# Patient Record
Sex: FEMALE | Race: WHITE | NOT HISPANIC OR LATINO | Employment: OTHER | URBAN - METROPOLITAN AREA
[De-identification: names, ages, dates, MRNs, and addresses within clinical notes are randomized per-mention and may not be internally consistent; named-entity substitution may affect disease eponyms.]

---

## 2018-10-28 ENCOUNTER — HOSPITAL ENCOUNTER (INPATIENT)
Facility: HOSPITAL | Age: 48
LOS: 5 days | Discharge: HOME | DRG: 698 | End: 2018-11-02
Attending: INTERNAL MEDICINE | Admitting: INTERNAL MEDICINE
Payer: MEDICAID

## 2018-10-28 DIAGNOSIS — N12 PYELONEPHRITIS: Primary | ICD-10-CM

## 2018-10-28 DIAGNOSIS — E11.9 DIABETES MELLITUS: ICD-10-CM

## 2018-10-28 DIAGNOSIS — A41.9 SEPSIS: ICD-10-CM

## 2018-10-28 DIAGNOSIS — E11.10 DIABETIC KETOACIDOSIS WITHOUT COMA ASSOCIATED WITH TYPE 2 DIABETES MELLITUS: ICD-10-CM

## 2018-10-28 PROBLEM — D84.9 IMMUNOSUPPRESSION: Status: ACTIVE | Noted: 2018-10-28

## 2018-10-28 PROBLEM — E86.0 DEHYDRATION: Status: ACTIVE | Noted: 2018-10-28

## 2018-10-28 PROBLEM — N17.9 AKI (ACUTE KIDNEY INJURY): Status: ACTIVE | Noted: 2018-10-28

## 2018-10-28 PROBLEM — Z94.0 HISTORY OF KIDNEY TRANSPLANT: Status: ACTIVE | Noted: 2018-10-28

## 2018-10-28 LAB
ALBUMIN SERPL BCP-MCNC: 2.3 G/DL
ALLENS TEST: ABNORMAL
ALP SERPL-CCNC: 78 U/L
ALT SERPL W/O P-5'-P-CCNC: 49 U/L
ANION GAP SERPL CALC-SCNC: 14 MMOL/L
AST SERPL-CCNC: 52 U/L
BACTERIA #/AREA URNS AUTO: ABNORMAL /HPF
BASOPHILS # BLD AUTO: 0.02 K/UL
BASOPHILS NFR BLD: 0.2 %
BILIRUB SERPL-MCNC: 0.4 MG/DL
BILIRUB UR QL STRIP: NEGATIVE
BUN SERPL-MCNC: 93 MG/DL
CALCIUM SERPL-MCNC: 8.2 MG/DL
CHLORIDE SERPL-SCNC: 104 MMOL/L
CLARITY UR REFRACT.AUTO: ABNORMAL
CO2 SERPL-SCNC: 12 MMOL/L
COLOR UR AUTO: YELLOW
CREAT SERPL-MCNC: 5.8 MG/DL
DELSYS: ABNORMAL
DIFFERENTIAL METHOD: ABNORMAL
EOSINOPHIL # BLD AUTO: 0 K/UL
EOSINOPHIL NFR BLD: 0 %
ERYTHROCYTE [DISTWIDTH] IN BLOOD BY AUTOMATED COUNT: 12.2 %
EST. GFR  (AFRICAN AMERICAN): 9.1 ML/MIN/1.73 M^2
EST. GFR  (NON AFRICAN AMERICAN): 7.9 ML/MIN/1.73 M^2
GLUCOSE SERPL-MCNC: 465 MG/DL
GLUCOSE UR QL STRIP: ABNORMAL
HCO3 UR-SCNC: 10.9 MMOL/L (ref 24–28)
HCT VFR BLD AUTO: 36.2 %
HGB BLD-MCNC: 12.4 G/DL
HGB UR QL STRIP: ABNORMAL
IMM GRANULOCYTES # BLD AUTO: 0.11 K/UL
IMM GRANULOCYTES NFR BLD AUTO: 1.2 %
KETONES UR QL STRIP: ABNORMAL
LACTATE SERPL-SCNC: 0.9 MMOL/L
LEUKOCYTE ESTERASE UR QL STRIP: ABNORMAL
LYMPHOCYTES # BLD AUTO: 0.6 K/UL
LYMPHOCYTES NFR BLD: 6.9 %
MCH RBC QN AUTO: 29.8 PG
MCHC RBC AUTO-ENTMCNC: 34.3 G/DL
MCV RBC AUTO: 87 FL
MICROSCOPIC COMMENT: ABNORMAL
MODE: ABNORMAL
MONOCYTES # BLD AUTO: 0.3 K/UL
MONOCYTES NFR BLD: 3.2 %
NEUTROPHILS # BLD AUTO: 8.1 K/UL
NEUTROPHILS NFR BLD: 88.5 %
NITRITE UR QL STRIP: NEGATIVE
NRBC BLD-RTO: 0 /100 WBC
PCO2 BLDA: 23.4 MMHG (ref 35–45)
PH SMN: 7.28 [PH] (ref 7.35–7.45)
PH UR STRIP: 5 [PH] (ref 5–8)
PLATELET # BLD AUTO: 84 K/UL
PMV BLD AUTO: 11.6 FL
PO2 BLDA: 85 MMHG (ref 80–100)
POC BE: -16 MMOL/L
POC SATURATED O2: 95 % (ref 95–100)
POC TCO2: 12 MMOL/L (ref 23–27)
POCT GLUCOSE: 402 MG/DL (ref 70–110)
POTASSIUM SERPL-SCNC: 4.5 MMOL/L
PROT SERPL-MCNC: 6.1 G/DL
PROT UR QL STRIP: NEGATIVE
RBC # BLD AUTO: 4.16 M/UL
RBC #/AREA URNS AUTO: 13 /HPF (ref 0–4)
SAMPLE: ABNORMAL
SITE: ABNORMAL
SODIUM SERPL-SCNC: 130 MMOL/L
SP GR UR STRIP: 1.01 (ref 1–1.03)
SP02: 99
SQUAMOUS #/AREA URNS AUTO: 1 /HPF
URN SPEC COLLECT METH UR: ABNORMAL
WBC # BLD AUTO: 9.13 K/UL
WBC #/AREA URNS AUTO: 12 /HPF (ref 0–5)
YEAST UR QL AUTO: ABNORMAL

## 2018-10-28 PROCEDURE — 84100 ASSAY OF PHOSPHORUS: CPT

## 2018-10-28 PROCEDURE — 85025 COMPLETE CBC W/AUTO DIFF WBC: CPT

## 2018-10-28 PROCEDURE — 87186 SC STD MICRODIL/AGAR DIL: CPT

## 2018-10-28 PROCEDURE — 20000000 HC ICU ROOM

## 2018-10-28 PROCEDURE — 63600175 PHARM REV CODE 636 W HCPCS: Performed by: STUDENT IN AN ORGANIZED HEALTH CARE EDUCATION/TRAINING PROGRAM

## 2018-10-28 PROCEDURE — 51798 US URINE CAPACITY MEASURE: CPT

## 2018-10-28 PROCEDURE — 87077 CULTURE AEROBIC IDENTIFY: CPT

## 2018-10-28 PROCEDURE — 51701 INSERT BLADDER CATHETER: CPT

## 2018-10-28 PROCEDURE — 99900035 HC TECH TIME PER 15 MIN (STAT)

## 2018-10-28 PROCEDURE — 99233 SBSQ HOSP IP/OBS HIGH 50: CPT | Mod: ,,, | Performed by: INTERNAL MEDICINE

## 2018-10-28 PROCEDURE — 87088 URINE BACTERIA CULTURE: CPT

## 2018-10-28 PROCEDURE — 51702 INSERT TEMP BLADDER CATH: CPT

## 2018-10-28 PROCEDURE — 83605 ASSAY OF LACTIC ACID: CPT

## 2018-10-28 PROCEDURE — 87086 URINE CULTURE/COLONY COUNT: CPT

## 2018-10-28 PROCEDURE — 80053 COMPREHEN METABOLIC PANEL: CPT

## 2018-10-28 PROCEDURE — 99223 1ST HOSP IP/OBS HIGH 75: CPT | Mod: AI,,, | Performed by: INTERNAL MEDICINE

## 2018-10-28 PROCEDURE — 25000003 PHARM REV CODE 250: Performed by: STUDENT IN AN ORGANIZED HEALTH CARE EDUCATION/TRAINING PROGRAM

## 2018-10-28 PROCEDURE — 81001 URINALYSIS AUTO W/SCOPE: CPT

## 2018-10-28 PROCEDURE — 82803 BLOOD GASES ANY COMBINATION: CPT

## 2018-10-28 PROCEDURE — 36600 WITHDRAWAL OF ARTERIAL BLOOD: CPT

## 2018-10-28 PROCEDURE — 94761 N-INVAS EAR/PLS OXIMETRY MLT: CPT

## 2018-10-28 RX ORDER — GLUCAGON 1 MG
1 KIT INJECTION
Status: DISCONTINUED | OUTPATIENT
Start: 2018-10-28 | End: 2018-10-29

## 2018-10-28 RX ORDER — ENOXAPARIN SODIUM 100 MG/ML
30 INJECTION SUBCUTANEOUS EVERY 24 HOURS
Status: DISCONTINUED | OUTPATIENT
Start: 2018-10-28 | End: 2018-10-29

## 2018-10-28 RX ORDER — PREDNISONE 5 MG/1
5 TABLET ORAL DAILY
Status: DISCONTINUED | OUTPATIENT
Start: 2018-10-29 | End: 2018-11-02 | Stop reason: HOSPADM

## 2018-10-28 RX ORDER — MAGNESIUM SULFATE HEPTAHYDRATE 40 MG/ML
4 INJECTION, SOLUTION INTRAVENOUS
Status: DISCONTINUED | OUTPATIENT
Start: 2018-10-28 | End: 2018-10-29

## 2018-10-28 RX ORDER — PANTOPRAZOLE SODIUM 40 MG/10ML
40 INJECTION, POWDER, LYOPHILIZED, FOR SOLUTION INTRAVENOUS DAILY
Status: DISCONTINUED | OUTPATIENT
Start: 2018-10-29 | End: 2018-10-29

## 2018-10-28 RX ORDER — LANOLIN ALCOHOL/MO/W.PET/CERES
800 CREAM (GRAM) TOPICAL
Status: DISCONTINUED | OUTPATIENT
Start: 2018-10-28 | End: 2018-10-29

## 2018-10-28 RX ORDER — POTASSIUM CHLORIDE 20 MEQ/15ML
40 SOLUTION ORAL
Status: DISCONTINUED | OUTPATIENT
Start: 2018-10-28 | End: 2018-10-29

## 2018-10-28 RX ORDER — INSULIN ASPART 100 [IU]/ML
0-5 INJECTION, SOLUTION INTRAVENOUS; SUBCUTANEOUS
Status: DISCONTINUED | OUTPATIENT
Start: 2018-10-28 | End: 2018-10-28

## 2018-10-28 RX ORDER — ESCITALOPRAM OXALATE 10 MG/1
10 TABLET ORAL DAILY
Status: DISCONTINUED | OUTPATIENT
Start: 2018-10-28 | End: 2018-11-02 | Stop reason: HOSPADM

## 2018-10-28 RX ORDER — SODIUM CHLORIDE 0.9 % (FLUSH) 0.9 %
3 SYRINGE (ML) INJECTION
Status: DISCONTINUED | OUTPATIENT
Start: 2018-10-28 | End: 2018-11-02 | Stop reason: HOSPADM

## 2018-10-28 RX ORDER — IBUPROFEN 200 MG
16 TABLET ORAL
Status: DISCONTINUED | OUTPATIENT
Start: 2018-10-28 | End: 2018-10-29

## 2018-10-28 RX ORDER — MAGNESIUM SULFATE HEPTAHYDRATE 40 MG/ML
2 INJECTION, SOLUTION INTRAVENOUS
Status: DISCONTINUED | OUTPATIENT
Start: 2018-10-28 | End: 2018-10-29

## 2018-10-28 RX ORDER — ONDANSETRON 2 MG/ML
4 INJECTION INTRAMUSCULAR; INTRAVENOUS EVERY 6 HOURS PRN
Status: DISCONTINUED | OUTPATIENT
Start: 2018-10-28 | End: 2018-11-02 | Stop reason: HOSPADM

## 2018-10-28 RX ORDER — IBUPROFEN 200 MG
24 TABLET ORAL
Status: DISCONTINUED | OUTPATIENT
Start: 2018-10-28 | End: 2018-10-29

## 2018-10-28 RX ADMIN — ESCITALOPRAM OXALATE 10 MG: 10 TABLET ORAL at 10:10

## 2018-10-28 RX ADMIN — INSULIN ASPART 3 UNITS: 100 INJECTION, SOLUTION INTRAVENOUS; SUBCUTANEOUS at 10:10

## 2018-10-28 RX ADMIN — ENOXAPARIN SODIUM 30 MG: 100 INJECTION SUBCUTANEOUS at 10:10

## 2018-10-29 PROBLEM — E83.42 HYPOMAGNESEMIA: Status: ACTIVE | Noted: 2018-10-29

## 2018-10-29 PROBLEM — Z79.60 LONG-TERM USE OF IMMUNOSUPPRESSANT MEDICATION: Status: ACTIVE | Noted: 2018-10-29

## 2018-10-29 LAB
ANION GAP SERPL CALC-SCNC: 10 MMOL/L
ANION GAP SERPL CALC-SCNC: 11 MMOL/L
ANION GAP SERPL CALC-SCNC: 15 MMOL/L
ANION GAP SERPL CALC-SCNC: 9 MMOL/L
ANION GAP SERPL CALC-SCNC: 9 MMOL/L
B-OH-BUTYR BLD STRIP-SCNC: 3 MMOL/L
BASOPHILS # BLD AUTO: 0.02 K/UL
BASOPHILS NFR BLD: 0.2 %
BUN SERPL-MCNC: 100 MG/DL
BUN SERPL-MCNC: 95 MG/DL
BUN SERPL-MCNC: 96 MG/DL
BUN SERPL-MCNC: 96 MG/DL
BUN SERPL-MCNC: 99 MG/DL
CALCIUM SERPL-MCNC: 8.3 MG/DL
CALCIUM SERPL-MCNC: 8.4 MG/DL
CALCIUM SERPL-MCNC: 8.4 MG/DL
CALCIUM SERPL-MCNC: 8.6 MG/DL
CALCIUM SERPL-MCNC: 8.9 MG/DL
CHLORIDE SERPL-SCNC: 102 MMOL/L
CHLORIDE SERPL-SCNC: 104 MMOL/L
CHLORIDE SERPL-SCNC: 105 MMOL/L
CHLORIDE SERPL-SCNC: 105 MMOL/L
CHLORIDE SERPL-SCNC: 106 MMOL/L
CHLORIDE UR-SCNC: 70 MMOL/L
CO2 SERPL-SCNC: 12 MMOL/L
CO2 SERPL-SCNC: 18 MMOL/L
CO2 SERPL-SCNC: 19 MMOL/L
CO2 SERPL-SCNC: 19 MMOL/L
CO2 SERPL-SCNC: 20 MMOL/L
CREAT SERPL-MCNC: 5.2 MG/DL
CREAT SERPL-MCNC: 5.2 MG/DL
CREAT SERPL-MCNC: 5.3 MG/DL
CREAT SERPL-MCNC: 5.7 MG/DL
CREAT SERPL-MCNC: 5.8 MG/DL
DIFFERENTIAL METHOD: ABNORMAL
EOSINOPHIL # BLD AUTO: 0 K/UL
EOSINOPHIL NFR BLD: 0 %
ERYTHROCYTE [DISTWIDTH] IN BLOOD BY AUTOMATED COUNT: 12.2 %
EST. GFR  (AFRICAN AMERICAN): 10.2 ML/MIN/1.73 M^2
EST. GFR  (AFRICAN AMERICAN): 10.4 ML/MIN/1.73 M^2
EST. GFR  (AFRICAN AMERICAN): 10.4 ML/MIN/1.73 M^2
EST. GFR  (AFRICAN AMERICAN): 9.1 ML/MIN/1.73 M^2
EST. GFR  (AFRICAN AMERICAN): 9.3 ML/MIN/1.73 M^2
EST. GFR  (NON AFRICAN AMERICAN): 7.9 ML/MIN/1.73 M^2
EST. GFR  (NON AFRICAN AMERICAN): 8.1 ML/MIN/1.73 M^2
EST. GFR  (NON AFRICAN AMERICAN): 8.8 ML/MIN/1.73 M^2
EST. GFR  (NON AFRICAN AMERICAN): 9 ML/MIN/1.73 M^2
EST. GFR  (NON AFRICAN AMERICAN): 9 ML/MIN/1.73 M^2
ESTIMATED AVG GLUCOSE: 226 MG/DL
GLUCOSE SERPL-MCNC: 215 MG/DL
GLUCOSE SERPL-MCNC: 222 MG/DL
GLUCOSE SERPL-MCNC: 249 MG/DL
GLUCOSE SERPL-MCNC: 431 MG/DL
GLUCOSE SERPL-MCNC: 567 MG/DL
HBA1C MFR BLD HPLC: 9.5 %
HCT VFR BLD AUTO: 35.9 %
HGB BLD-MCNC: 12.6 G/DL
IMM GRANULOCYTES # BLD AUTO: 0.11 K/UL
IMM GRANULOCYTES NFR BLD AUTO: 1.2 %
LYMPHOCYTES # BLD AUTO: 0.6 K/UL
LYMPHOCYTES NFR BLD: 6 %
MAGNESIUM SERPL-MCNC: 1.4 MG/DL
MAGNESIUM SERPL-MCNC: 2.4 MG/DL
MAGNESIUM SERPL-MCNC: 2.5 MG/DL
MAGNESIUM SERPL-MCNC: 2.6 MG/DL
MCH RBC QN AUTO: 30.1 PG
MCHC RBC AUTO-ENTMCNC: 35.1 G/DL
MCV RBC AUTO: 86 FL
MONOCYTES # BLD AUTO: 0.3 K/UL
MONOCYTES NFR BLD: 3.3 %
NEUTROPHILS # BLD AUTO: 8.4 K/UL
NEUTROPHILS NFR BLD: 89.3 %
NRBC BLD-RTO: 0 /100 WBC
PHOSPHATE SERPL-MCNC: 2.9 MG/DL
PHOSPHATE SERPL-MCNC: 3.4 MG/DL
PHOSPHATE SERPL-MCNC: 3.9 MG/DL
PHOSPHATE SERPL-MCNC: 4.1 MG/DL
PHOSPHATE SERPL-MCNC: 4.2 MG/DL
PHOSPHATE SERPL-MCNC: 4.6 MG/DL
PLATELET # BLD AUTO: 91 K/UL
PMV BLD AUTO: 11.9 FL
POCT GLUCOSE: 204 MG/DL (ref 70–110)
POCT GLUCOSE: 205 MG/DL (ref 70–110)
POCT GLUCOSE: 216 MG/DL (ref 70–110)
POCT GLUCOSE: 217 MG/DL (ref 70–110)
POCT GLUCOSE: 217 MG/DL (ref 70–110)
POCT GLUCOSE: 225 MG/DL (ref 70–110)
POCT GLUCOSE: 253 MG/DL (ref 70–110)
POCT GLUCOSE: 296 MG/DL (ref 70–110)
POCT GLUCOSE: 371 MG/DL (ref 70–110)
POCT GLUCOSE: 411 MG/DL (ref 70–110)
POCT GLUCOSE: 447 MG/DL (ref 70–110)
POCT GLUCOSE: 475 MG/DL (ref 70–110)
POCT GLUCOSE: 482 MG/DL (ref 70–110)
POCT GLUCOSE: 493 MG/DL (ref 70–110)
POCT GLUCOSE: >500 MG/DL (ref 70–110)
POTASSIUM SERPL-SCNC: 3.5 MMOL/L
POTASSIUM SERPL-SCNC: 3.6 MMOL/L
POTASSIUM SERPL-SCNC: 3.7 MMOL/L
POTASSIUM SERPL-SCNC: 4 MMOL/L
POTASSIUM SERPL-SCNC: 4.5 MMOL/L
RBC # BLD AUTO: 4.19 M/UL
SODIUM SERPL-SCNC: 129 MMOL/L
SODIUM SERPL-SCNC: 132 MMOL/L
SODIUM SERPL-SCNC: 134 MMOL/L
SODIUM SERPL-SCNC: 134 MMOL/L
SODIUM SERPL-SCNC: 135 MMOL/L
TACROLIMUS BLD-MCNC: 7.5 NG/ML
TACROLIMUS BLD-MCNC: 9 NG/ML
WBC # BLD AUTO: 9.38 K/UL

## 2018-10-29 PROCEDURE — 83735 ASSAY OF MAGNESIUM: CPT

## 2018-10-29 PROCEDURE — 25000003 PHARM REV CODE 250: Performed by: INTERNAL MEDICINE

## 2018-10-29 PROCEDURE — 02HV33Z INSERTION OF INFUSION DEVICE INTO SUPERIOR VENA CAVA, PERCUTANEOUS APPROACH: ICD-10-PCS | Performed by: INTERNAL MEDICINE

## 2018-10-29 PROCEDURE — 80197 ASSAY OF TACROLIMUS: CPT

## 2018-10-29 PROCEDURE — A4217 STERILE WATER/SALINE, 500 ML: HCPCS | Performed by: INTERNAL MEDICINE

## 2018-10-29 PROCEDURE — 85025 COMPLETE CBC W/AUTO DIFF WBC: CPT

## 2018-10-29 PROCEDURE — 36556 INSERT NON-TUNNEL CV CATH: CPT | Mod: ,,, | Performed by: INTERNAL MEDICINE

## 2018-10-29 PROCEDURE — 93005 ELECTROCARDIOGRAM TRACING: CPT

## 2018-10-29 PROCEDURE — 36415 COLL VENOUS BLD VENIPUNCTURE: CPT

## 2018-10-29 PROCEDURE — 84100 ASSAY OF PHOSPHORUS: CPT

## 2018-10-29 PROCEDURE — 84133 ASSAY OF URINE POTASSIUM: CPT

## 2018-10-29 PROCEDURE — 25000003 PHARM REV CODE 250: Performed by: STUDENT IN AN ORGANIZED HEALTH CARE EDUCATION/TRAINING PROGRAM

## 2018-10-29 PROCEDURE — C1751 CATH, INF, PER/CENT/MIDLINE: HCPCS

## 2018-10-29 PROCEDURE — 80048 BASIC METABOLIC PNL TOTAL CA: CPT | Mod: 91

## 2018-10-29 PROCEDURE — 82010 KETONE BODYS QUAN: CPT

## 2018-10-29 PROCEDURE — 84300 ASSAY OF URINE SODIUM: CPT

## 2018-10-29 PROCEDURE — 63600175 PHARM REV CODE 636 W HCPCS: Performed by: STUDENT IN AN ORGANIZED HEALTH CARE EDUCATION/TRAINING PROGRAM

## 2018-10-29 PROCEDURE — 36556 INSERT NON-TUNNEL CV CATH: CPT

## 2018-10-29 PROCEDURE — 20000000 HC ICU ROOM

## 2018-10-29 PROCEDURE — 83036 HEMOGLOBIN GLYCOSYLATED A1C: CPT

## 2018-10-29 PROCEDURE — C9113 INJ PANTOPRAZOLE SODIUM, VIA: HCPCS | Performed by: STUDENT IN AN ORGANIZED HEALTH CARE EDUCATION/TRAINING PROGRAM

## 2018-10-29 PROCEDURE — 93010 ELECTROCARDIOGRAM REPORT: CPT | Mod: ,,, | Performed by: INTERNAL MEDICINE

## 2018-10-29 PROCEDURE — 99233 SBSQ HOSP IP/OBS HIGH 50: CPT | Mod: ,,, | Performed by: INTERNAL MEDICINE

## 2018-10-29 PROCEDURE — 63600175 PHARM REV CODE 636 W HCPCS: Performed by: INTERNAL MEDICINE

## 2018-10-29 PROCEDURE — 80197 ASSAY OF TACROLIMUS: CPT | Mod: 91

## 2018-10-29 PROCEDURE — 82436 ASSAY OF URINE CHLORIDE: CPT

## 2018-10-29 PROCEDURE — 83735 ASSAY OF MAGNESIUM: CPT | Mod: 91

## 2018-10-29 RX ORDER — GLUCAGON 1 MG
1 KIT INJECTION
Status: DISCONTINUED | OUTPATIENT
Start: 2018-10-29 | End: 2018-10-30

## 2018-10-29 RX ORDER — INSULIN ASPART 100 [IU]/ML
0-5 INJECTION, SOLUTION INTRAVENOUS; SUBCUTANEOUS
Status: DISCONTINUED | OUTPATIENT
Start: 2018-10-29 | End: 2018-10-30

## 2018-10-29 RX ORDER — ACETAMINOPHEN 325 MG/1
650 TABLET ORAL EVERY 6 HOURS PRN
Status: DISCONTINUED | OUTPATIENT
Start: 2018-10-29 | End: 2018-11-02 | Stop reason: HOSPADM

## 2018-10-29 RX ORDER — SODIUM CHLORIDE, SODIUM LACTATE, POTASSIUM CHLORIDE, CALCIUM CHLORIDE 600; 310; 30; 20 MG/100ML; MG/100ML; MG/100ML; MG/100ML
INJECTION, SOLUTION INTRAVENOUS CONTINUOUS
Status: ACTIVE | OUTPATIENT
Start: 2018-10-29 | End: 2018-10-29

## 2018-10-29 RX ORDER — CEFTRIAXONE 1 G/1
1 INJECTION, POWDER, FOR SOLUTION INTRAMUSCULAR; INTRAVENOUS
Status: DISCONTINUED | OUTPATIENT
Start: 2018-10-29 | End: 2018-11-01

## 2018-10-29 RX ORDER — IBUPROFEN 200 MG
16 TABLET ORAL
Status: DISCONTINUED | OUTPATIENT
Start: 2018-10-29 | End: 2018-11-02 | Stop reason: HOSPADM

## 2018-10-29 RX ORDER — MAGNESIUM SULFATE HEPTAHYDRATE 40 MG/ML
2 INJECTION, SOLUTION INTRAVENOUS ONCE
Status: COMPLETED | OUTPATIENT
Start: 2018-10-29 | End: 2018-10-29

## 2018-10-29 RX ORDER — POTASSIUM CHLORIDE 20 MEQ/1
20 TABLET, EXTENDED RELEASE ORAL ONCE
Status: COMPLETED | OUTPATIENT
Start: 2018-10-29 | End: 2018-10-29

## 2018-10-29 RX ORDER — TACROLIMUS 1 MG/1
4 CAPSULE ORAL 2 TIMES DAILY
Status: DISCONTINUED | OUTPATIENT
Start: 2018-10-29 | End: 2018-10-30

## 2018-10-29 RX ORDER — IBUPROFEN 200 MG
24 TABLET ORAL
Status: DISCONTINUED | OUTPATIENT
Start: 2018-10-29 | End: 2018-11-02 | Stop reason: HOSPADM

## 2018-10-29 RX ORDER — POTASSIUM CHLORIDE 20 MEQ/1
20 TABLET, EXTENDED RELEASE ORAL EVERY 4 HOURS
Status: COMPLETED | OUTPATIENT
Start: 2018-10-29 | End: 2018-10-29

## 2018-10-29 RX ORDER — HEPARIN SODIUM 5000 [USP'U]/ML
5000 INJECTION, SOLUTION INTRAVENOUS; SUBCUTANEOUS EVERY 12 HOURS
Status: DISCONTINUED | OUTPATIENT
Start: 2018-10-29 | End: 2018-11-02 | Stop reason: HOSPADM

## 2018-10-29 RX ADMIN — SODIUM CHLORIDE, SODIUM LACTATE, POTASSIUM CHLORIDE, AND CALCIUM CHLORIDE 1000 ML: .6; .31; .03; .02 INJECTION, SOLUTION INTRAVENOUS at 01:10

## 2018-10-29 RX ADMIN — POTASSIUM CHLORIDE 20 MEQ: 1500 TABLET, EXTENDED RELEASE ORAL at 10:10

## 2018-10-29 RX ADMIN — HEPARIN SODIUM 5000 UNITS: 5000 INJECTION, SOLUTION INTRAVENOUS; SUBCUTANEOUS at 09:10

## 2018-10-29 RX ADMIN — PREDNISONE 5 MG: 2.5 TABLET ORAL at 08:10

## 2018-10-29 RX ADMIN — ESCITALOPRAM OXALATE 10 MG: 10 TABLET ORAL at 08:10

## 2018-10-29 RX ADMIN — SODIUM CHLORIDE, SODIUM LACTATE, POTASSIUM CHLORIDE, AND CALCIUM CHLORIDE: .6; .31; .03; .02 INJECTION, SOLUTION INTRAVENOUS at 09:10

## 2018-10-29 RX ADMIN — TACROLIMUS 4 MG: 1 CAPSULE ORAL at 05:10

## 2018-10-29 RX ADMIN — CEFTRIAXONE SODIUM 1 G: 1 INJECTION, POWDER, FOR SOLUTION INTRAMUSCULAR; INTRAVENOUS at 10:10

## 2018-10-29 RX ADMIN — SODIUM CHLORIDE 4 UNITS/HR: 9 INJECTION, SOLUTION INTRAVENOUS at 03:10

## 2018-10-29 RX ADMIN — SODIUM BICARBONATE: 84 INJECTION INTRAVENOUS at 04:10

## 2018-10-29 RX ADMIN — PANTOPRAZOLE SODIUM 40 MG: 40 INJECTION, POWDER, FOR SOLUTION INTRAVENOUS at 08:10

## 2018-10-29 RX ADMIN — MAGNESIUM SULFATE HEPTAHYDRATE 1 G: 500 INJECTION, SOLUTION INTRAMUSCULAR; INTRAVENOUS at 06:10

## 2018-10-29 RX ADMIN — HEPARIN SODIUM 5000 UNITS: 5000 INJECTION, SOLUTION INTRAVENOUS; SUBCUTANEOUS at 10:10

## 2018-10-29 RX ADMIN — TACROLIMUS 4 MG: 1 CAPSULE ORAL at 10:10

## 2018-10-29 RX ADMIN — ACETAMINOPHEN 650 MG: 325 TABLET ORAL at 01:10

## 2018-10-29 RX ADMIN — INSULIN ASPART 1 UNITS: 100 INJECTION, SOLUTION INTRAVENOUS; SUBCUTANEOUS at 09:10

## 2018-10-29 RX ADMIN — MAGNESIUM SULFATE IN WATER 2 G: 40 INJECTION, SOLUTION INTRAVENOUS at 10:10

## 2018-10-29 RX ADMIN — POTASSIUM CHLORIDE 20 MEQ: 1500 TABLET, EXTENDED RELEASE ORAL at 02:10

## 2018-10-29 RX ADMIN — INSULIN DETEMIR 10 UNITS: 100 INJECTION, SOLUTION SUBCUTANEOUS at 05:10

## 2018-10-29 RX ADMIN — SODIUM CHLORIDE 4 UNITS/HR: 9 INJECTION, SOLUTION INTRAVENOUS at 10:10

## 2018-10-29 RX ADMIN — SODIUM CHLORIDE 6 UNITS/HR: 9 INJECTION, SOLUTION INTRAVENOUS at 11:10

## 2018-10-29 RX ADMIN — POTASSIUM CHLORIDE 20 MEQ: 1500 TABLET, EXTENDED RELEASE ORAL at 05:10

## 2018-10-29 NOTE — PLAN OF CARE
Problem: Patient Care Overview  Goal: Plan of Care Review  Outcome: Ongoing (interventions implemented as appropriate)  Pt admitted from OSH.  Ambulated to bed.  AAOx4. Follows commands. GIANG.  Tachypneic.  NSR on monitor. Had episode of atrial fibrillation after line placement. EKG obtained; MD at bedside; pt converted to NSR.  Sun was exchanged and urinalysis was sent to lab.  BM x1; loose.  Abdomen obese, rounded.  R IJ TLC; Insulin infusing and sodium bicarbonate drip.  Kidney US completed.  Bed locked and in lowest position. SR upx2.  Call bell in reach. Will continue to monitor.

## 2018-10-29 NOTE — SUBJECTIVE & OBJECTIVE
No past medical history on file.    No past surgical history on file.    Review of patient's allergies indicates:   Allergen Reactions    Iron analogues Anaphylaxis    Morphine Other (See Comments)     Burns all over once IVP    Pcn [penicillins] Rash       Family History     None        Tobacco Use    Smoking status: Not on file   Substance and Sexual Activity    Alcohol use: Not on file    Drug use: Not on file    Sexual activity: Not on file      Review of Systems   Constitutional: Positive for fatigue. Negative for chills, diaphoresis and fever.   HENT: Negative for congestion.    Eyes: Negative for visual disturbance.   Respiratory: Negative for shortness of breath.    Cardiovascular: Negative for chest pain, palpitations and leg swelling.   Gastrointestinal: Negative for abdominal distention and abdominal pain.   Endocrine: Negative for polyuria.   Genitourinary: Positive for decreased urine volume and difficulty urinating. Negative for dysuria, flank pain, hematuria and urgency.   Musculoskeletal: Negative for back pain.   Skin: Negative for color change.   Neurological: Negative for light-headedness.   Psychiatric/Behavioral: Negative for agitation and behavioral problems. The patient is nervous/anxious.      Objective:     Vital Signs (Most Recent):  Temp: 97.7 °F (36.5 °C) (10/28/18 2041)  Pulse: 65 (10/28/18 2110)  Resp: (!) 34 (10/28/18 2110)  SpO2: 95 % (10/28/18 2110) Vital Signs (24h Range):  Temp:  [97.7 °F (36.5 °C)-98.4 °F (36.9 °C)] 97.7 °F (36.5 °C)  Pulse:  [62-65] 65  Resp:  [24-34] 34  SpO2:  [93 %-95 %] 95 %  BP: (116-133)/(75-90) 133/90   Weight: 99.4 kg (219 lb 2.2 oz)  Body mass index is 40.08 kg/m².      Intake/Output Summary (Last 24 hours) at 10/28/2018 2150  Last data filed at 10/28/2018 2110  Gross per 24 hour   Intake --   Output 600 ml   Net -600 ml       Physical Exam   Constitutional: She appears well-developed. No distress.   Mildly fatigued appearing 48 year old   woman in no distress   HENT:   Head: Normocephalic and atraumatic.   Eyes: Conjunctivae and EOM are normal.   Neck: Normal range of motion. Neck supple.   Cardiovascular: Normal rate, regular rhythm, normal heart sounds and intact distal pulses.   Pulmonary/Chest: No respiratory distress. She has no wheezes.   Mildly increased respiratory effort   Abdominal: Soft. Bowel sounds are normal. She exhibits no distension. There is no tenderness.   Musculoskeletal: Normal range of motion. She exhibits no edema or tenderness.   Neurological: No cranial nerve deficit.   Skin: Skin is warm and dry. She is not diaphoretic. No erythema.   Psychiatric:   Mild anxiety   Nursing note and vitals reviewed.      Vents:     Lines/Drains/Airways     Drain                 Hemodialysis AV Fistula Left upper arm -- days         Urethral Catheter 10/28/18 2146 16 Fr. less than 1 day          Peripheral Intravenous Line                 Peripheral IV - Single Lumen 10/26/18 Right Forearm 2 days              Significant Labs:    CBC/Anemia Profile:  No results for input(s): WBC, HGB, HCT, PLT, MCV, RDW, IRON, FERRITIN, RETIC, FOLATE, [id number], OCCULTBLOOD in the last 48 hours.     Chemistries:  No results for input(s): NA, K, CL, CO2, BUN, CREATININE, CALCIUM, ALBUMIN, PROT, BILITOT, ALKPHOS, ALT, AST, GLUCOSE, MG, PHOS in the last 48 hours.      Significant Imaging: None pending

## 2018-10-29 NOTE — HPI
48 year old  woman with hypertension, insulin dependent diabetes, history of right kidney cadaver transplant on Cellcept and Prograf, CKD stage 3, history of AV fistula status post reversal, and history of emergent caesarian delivery in 1990s transferred from [facility] for further management of an acute kidney injury with increase in creatinine from 1.7 (baseline) to 6 in the setting of admission and treatment for a presumed UTI. The patient was admitted on 10/26 and treated with Vancomycin and Levaquin at their facility. Her symptoms began on 10/24 and involved general malaise and nausea progressing to fevers and chills with a temperature of 103 at home. Patient received a CT scan at [facility] that made reference to mild perinephric fat stranding in the transplanted right kidney and severe atrophy in the left kidney with no signs of obstruction. Patient had an initial WBC count of 18.8 on admission (10/26) that improved to 14 today (10/28) prior to transfer. CBC at their facility also notable for mild thrombocytopenia < 90 for which the patient lacks any bleeding symptoms. Initial urinalysis was negative for nitrites and leukocyte esterase with 3+ protein and 15-20 RBC. Repeat urinalysis today (10/28) showed negative nitrites and negative leukocyte esterase. The patient currently lacks fevers/chills, nausea/vomiting, dysuria, and back pain. She arrived with a alvarado catheter with minimal urine output. Patient also has a history of anxiety for which she takes Lexapro daily.

## 2018-10-29 NOTE — PROGRESS NOTES
[facility]  Critical Care Medicine  Progress Note    Patient Name: Bhumi Siegel  MRN: [id number]  Admission Date: 10/28/2018  Hospital Length of Stay: 0 days  Code Status: Full Code  Attending Provider: Catracho  Primary Care Provider: Primary Doctor No   Principal Problem: <principal problem not specified>    Subjective:     HPI:  48 year old  woman with hypertension, insulin dependent diabetes, history of right kidney cadaver transplant on Cellcept and Prograf, CKD stage 3, history of AV fistula status post reversal, and history of emergent caesarian delivery in 1990s transferred from [facility] for further management of an acute kidney injury with increase in creatinine from 1.7 (baseline) to 6 in the setting of admission and treatment for a presumed UTI. The patient was admitted on 10/26 and treated with Vancomycin and Levaquin at their facility. Her symptoms began on 10/24 and involved general malaise and nausea progressing to fevers and chills with a temperature of 103 at home. Patient received a CT scan at [facility] that made reference to mild perinephric fat stranding in the transplanted right kidney and severe atrophy in the left kidney with no signs of obstruction. Patient had an initial WBC count of 18.8 on admission (10/26) that improved to 14 today (10/28) prior to transfer. CBC at their facility also notable for mild thrombocytopenia < 90 for which the patient lacks any bleeding symptoms. Lactic acid 1.2. Initial urinalysis was negative for nitrites and leukocyte esterase with 3+ protein and 15-20 RBC. Repeat urinalysis today (10/28) showed negative nitrites and negative leukocyte esterase. The patient currently lacks fevers/chills, nausea/vomiting, dysuria, and back pain. She arrived with a alvarado catheter with minimal urine output. Patient also has a history of anxiety for which she takes Lexapro daily.    Hospital/ICU Course:  10/28/18: Admitted to  MICU.    No past medical history on file.    No past surgical history on file.    Review of patient's allergies indicates:   Allergen Reactions    Iron analogues Anaphylaxis    Morphine Other (See Comments)     Burns all over once IVP    Pcn [penicillins] Rash       Family History     None        Tobacco Use    Smoking status: Not on file   Substance and Sexual Activity    Alcohol use: Not on file    Drug use: Not on file    Sexual activity: Not on file      Review of Systems   Constitutional: Positive for fatigue. Negative for chills, diaphoresis and fever.   HENT: Negative for congestion.    Eyes: Negative for visual disturbance.   Respiratory: Negative for shortness of breath.    Cardiovascular: Negative for chest pain, palpitations and leg swelling.   Gastrointestinal: Negative for abdominal distention and abdominal pain.   Endocrine: Negative for polyuria.   Genitourinary: Positive for decreased urine volume and difficulty urinating. Negative for dysuria, flank pain, hematuria and urgency.   Musculoskeletal: Negative for back pain.   Skin: Negative for color change.   Neurological: Negative for light-headedness.   Psychiatric/Behavioral: Negative for agitation and behavioral problems. The patient is nervous/anxious.      Objective:     Vital Signs (Most Recent):  Temp: 97.7 °F (36.5 °C) (10/28/18 2041)  Pulse: 65 (10/28/18 2110)  Resp: (!) 34 (10/28/18 2110)  SpO2: 95 % (10/28/18 2110) Vital Signs (24h Range):  Temp:  [97.7 °F (36.5 °C)-98.4 °F (36.9 °C)] 97.7 °F (36.5 °C)  Pulse:  [62-65] 65  Resp:  [24-34] 34  SpO2:  [93 %-95 %] 95 %  BP: (116-133)/(75-90) 133/90   Weight: 99.4 kg (219 lb 2.2 oz)  Body mass index is 40.08 kg/m².      Intake/Output Summary (Last 24 hours) at 10/28/2018 2150  Last data filed at 10/28/2018 2110  Gross per 24 hour   Intake --   Output 600 ml   Net -600 ml       Physical Exam   Constitutional: She appears well-developed. No distress.   Mildly fatigued appearing 48 year old   woman in no distress   HENT:   Head: Normocephalic and atraumatic.   Eyes: Conjunctivae and EOM are normal.   Neck: Normal range of motion. Neck supple.   Cardiovascular: Normal rate, regular rhythm, normal heart sounds and intact distal pulses.   Pulmonary/Chest: No respiratory distress. She has no wheezes.   Mildly increased respiratory effort   Abdominal: Soft. Bowel sounds are normal. She exhibits no distension. There is no tenderness.   Musculoskeletal: Normal range of motion. She exhibits no edema or tenderness.   Neurological: No cranial nerve deficit.   Skin: Skin is warm and dry. She is not diaphoretic. No erythema.   Psychiatric:   Mild anxiety   Nursing note and vitals reviewed.      Vents:     Lines/Drains/Airways     Drain                 Hemodialysis AV Fistula Left upper arm -- days         Urethral Catheter 10/28/18 2146 16 Fr. less than 1 day          Peripheral Intravenous Line                 Peripheral IV - Single Lumen 10/26/18 Right Forearm 2 days              Significant Labs:    CBC/Anemia Profile:  No results for input(s): WBC, HGB, HCT, PLT, MCV, RDW, IRON, FERRITIN, RETIC, FOLATE, [id number], OCCULTBLOOD in the last 48 hours.     Chemistries:  No results for input(s): NA, K, CL, CO2, BUN, CREATININE, CALCIUM, ALBUMIN, PROT, BILITOT, ALKPHOS, ALT, AST, GLUCOSE, MG, PHOS in the last 48 hours.      Significant Imaging: None pending    Assessment/Plan:     Renal/   History of kidney transplant    On Cellcept and Prograf outpatient regimen  Renal transplant medicine service consulted for recs given LISBET with perinephric fat stranding on outside hospital CTAP     LISBET (acute kidney injury)    Possibly due to dehydration both per OSH report and confirmed on bedside ultrasound on arrival  Will assess for DKA given hyperglycemia on arrival and report of non lactic acidosis at OSH     Dehydration    Suspect due to DKA given hyperglycemia to 400 on arrival  ABG pending  Bedside ultrasound  revealed nearly flat IVC, empty bladder, and no hydronephrosis  1 liter LR bolus ordered  Continue alvarado catheter for strict ins and outs  May recheck BMP tonight     ID   Sepsis    Treated with Vancomycin then Levaquin for presumed urosepsis at OSH  Low suspicion for sepsis at this time based on vital signs and clinical appearance  Patient with 1/4 SIRS criteria at this time (RR high 20s) with repeat CBC pending  UA on 10/26 notable for 15-20 RBC and 3+ protein with negative nitrites and negative leukocyte esterase with no information re: cultures  Repeat UA at OSH on 10/28 negative for nitrites and leukocyte esterase   Holding antibiotics for now  1L Lactated Ringer's bolus ordered on admission     Immunology/Multi System   Immunosuppression    See above        Critical Care Daily Checklist:    A: Awake: RASS Goal/Actual Goal:    Actual:     B: Spontaneous Breathing Trial Performed?     C: SAT & SBT Coordinated?  NA                  D: Delirium: CAM-ICU     E: Early Mobility Performed?    F: Feeding Goal:    Status:     Current Diet Order   Procedures    Diet renal      AS: Analgesia/Sedation NA   T: Thromboembolic Prophylaxis Lovenox   H: HOB > 300 Yes   U: Stress Ulcer Prophylaxis (if needed) PPI   G: Glucose Control Assessing for DKA   B: Bowel Function     I: Indwelling Catheter (Lines & Alvarado) Necessity Alvarado   D: De-escalation of Antimicrobials/Pharmacotherapies None     Plan for the day/ETD Assess for DKA and volume resuscitate    Code Status:  Family/Goals of Care: Full Code       Critical secondary to LISBET, hyperglycemia in setting of history of renal transplant and insulin dependent diabetes.     Critical care was time spent personally by me on the following activities: development of treatment plan with patient or surrogate and bedside caregivers, discussions with consultants, evaluation of patient's response to treatment, examination of patient, ordering and performing treatments and interventions,  ordering and review of laboratory studies, ordering and review of radiographic studies, pulse oximetry, re-evaluation of patient's condition. This critical care time did not overlap with that of any other provider or involve time for any procedures.     Dario Black MD  Critical Care Medicine  [facility]

## 2018-10-29 NOTE — ASSESSMENT & PLAN NOTE
- Continue with with Tacrolimus 4/4  - Will need to continue to monitor daily levels of tacrolimus. Last 7.5   - Continue with prednisone 5 mg q D  - Hold on MMF on the setting of recent infection with UTI  - Will evaluate daily for signs and symptoms of immunosuppression toxicity

## 2018-10-29 NOTE — HOSPITAL COURSE
10/28/18: Admitted to MICU.  10/29/18: Started on insulin drip for DKA and consulted to renal transplant service for further management.  10/30/18: Stopped insulin drip, started home insulin regimen PLUS regular insulin with meals, and started renal/diabetic diet with PT/OT consult.

## 2018-10-29 NOTE — SUBJECTIVE & OBJECTIVE
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 She arrived to [facility] as a transferred from [facility] for further management of an acute kidney injury which increased from baseline to 6 in the setting of admission and treatment for a presumed UTI. The patient was admitted on 10/26/18 and treated with Vancomycin and Levaquin at their facility. Her symptoms began on 10/24/18 which involved general malaise and nausea progressing to fevers and chills with a temperature of 103 at home. Patient received a CT scan at [facility] that made reference to mild perinephric fat stranding in the transplanted right kidney and severe atrophy in the left kidney with no signs of obstruction. Has been presenting with uncontrolled blood glucose which is treated for DKA, with insuline and bicarbonate ggt. [facility] was consulted for management of immunosuppression in the setting of LISBET, Sepsis secondary to UTI.           Ms. Siegel is a 48 y.o. year old female who is status post .    maintenance immunosuppression consists of:   Immunosuppressants (From admission, onward)    Start     Stop Route Frequency Ordered    10/29/18 1015  tacrolimus capsule 4 mg      -- Oral 2 times daily 10/29/18 0914          Interval History:  Patient evaluated at bedside, states that she is feeling some better today, compared with yesterday.     Past Medical and Surgical History: Ms. Siegel has a past medical history of Hypertension, Kidney transplant recipient, and Post-transplant diabetes mellitus.  She has a past  "surgical history that includes Kidney transplant.    Past Social and Family History: Ms. Siegel Her family history is not on file.    Intake/Output - Last 3 Shifts       10/27 0700 - 10/28 0659 10/28 0700 - 10/29 0659 10/29 0700 - 10/30 0659    P.O.  480     I.V. (mL/kg)  227.1 (2.3) 1422.4 (14.3)    IV Piggyback  1000     Total Intake(mL/kg)  1707.1 (17.2) 1422.4 (14.3)    Urine (mL/kg/hr)  1650 1290 (1.6)    Stool  0     Total Output  1650 1290    Net  +57.1 +132.4           Stool Occurrence  1 x            Review of Systems   Constitutional: Positive for fatigue. Negative for chills, diaphoresis and fever.   HENT: Negative for congestion.    Eyes: Negative for visual disturbance.   Respiratory: Negative for shortness of breath.    Cardiovascular: Negative for chest pain, palpitations and leg swelling.   Gastrointestinal: Negative for abdominal distention and abdominal pain.   Endocrine: Negative for polyuria.   Genitourinary: Positive for decreased urine volume and difficulty urinating. Negative for dysuria, flank pain, hematuria and urgency.   Musculoskeletal: Negative for back pain.   Skin: Negative for color change.   Neurological: Negative for light-headedness.   Psychiatric/Behavioral: Negative for agitation and behavioral problems. The patient is nervous/anxious.      Objective:     Vital Signs (Most Recent):  Temp: 97.8 °F (36.6 °C) (10/29/18 1100)  Pulse: (!) 51 (10/29/18 1500)  Resp: 19 (10/29/18 1500)  BP: (!) 86/54 (10/29/18 1500)  SpO2: (!) 92 % (10/29/18 1500) Vital Signs (24h Range):  Temp:  [97.6 °F (36.4 °C)-98.4 °F (36.9 °C)] 97.8 °F (36.6 °C)  Pulse:  [] 51  Resp:  [19-47] 19  SpO2:  [91 %-99 %] 92 %  BP: ()/(54-90) 86/54     Weight: 99.4 kg (219 lb 2.2 oz)  Height: 5' 2" (157.5 cm)  Body mass index is 40.08 kg/m².    Physical Exam   Constitutional: She appears well-developed. No distress.   HENT:   Head: Normocephalic and atraumatic.   Eyes: Conjunctivae and EOM are normal.   Neck: " Normal range of motion. Neck supple.   Cardiovascular: Normal rate, regular rhythm, normal heart sounds and intact distal pulses.   Pulmonary/Chest: No respiratory distress. She has no wheezes.   Abdominal: Soft. Bowel sounds are normal. She exhibits no distension. There is no tenderness.   Genitourinary:   Genitourinary Comments: Sun cath in place   Musculoskeletal: Normal range of motion. She exhibits no edema or tenderness.   Neurological: No cranial nerve deficit.   Skin: Skin is warm and dry. She is not diaphoretic. No erythema.   Psychiatric:   Mild anxiety   Nursing note and vitals reviewed.      Significant Labs:  CBC:   Recent Labs   Lab 10/28/18  2300 10/29/18  0354   WBC 9.13 9.38   RBC 4.16 4.19   HGB 12.4 12.6   HCT 36.2* 35.9*   PLT 84* 91*   MCV 87 86   MCH 29.8 30.1   MCHC 34.3 35.1     BMP:   Recent Labs   Lab 10/29/18  0354 10/29/18  0822 10/29/18  1204   * 431* 222*   * 132* 135*   K 4.0 3.6 3.5    104 105   CO2 12* 18* 19*   * 99* 96*   CREATININE 5.8* 5.7* 5.3*   CALCIUM 8.4* 8.3* 8.4*     CMP:   Recent Labs   Lab 10/28/18  2300 10/29/18  0354 10/29/18  0822 10/29/18  1204   * 567* 431* 222*   CALCIUM 8.2* 8.4* 8.3* 8.4*   ALBUMIN 2.3*  --   --   --    PROT 6.1  --   --   --    * 129* 132* 135*   K 4.5 4.0 3.6 3.5   CO2 12* 12* 18* 19*    102 104 105   BUN 93* 100* 99* 96*   CREATININE 5.8* 5.8* 5.7* 5.3*   ALKPHOS 78  --   --   --    ALT 49*  --   --   --    AST 52*  --   --   --      Cardiac Markers: No results for input(s): CKMB, TROPONINT, MYOGLOBIN in the last 168 hours.  ABGs:   Recent Labs   Lab 10/28/18  2317   PH 7.277*   PCO2 23.4*   HCO3 10.9*   POCSATURATED 95   BE -16

## 2018-10-29 NOTE — ASSESSMENT & PLAN NOTE
Suspect due to DKA given hyperglycemia to 400 on arrival  ABG pending  Bedside ultrasound revealed nearly flat IVC, empty bladder, and no hydronephrosis  1 liter LR bolus ordered  Continue alvarado catheter for strict ins and outs  May recheck BMP tonight

## 2018-10-29 NOTE — ASSESSMENT & PLAN NOTE
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  - Will try to get information of transplant from outside hospital

## 2018-10-29 NOTE — ASSESSMENT & PLAN NOTE
- LISBET superimposed on CKD which has been secondary to pre- renal vs iATN secondary do decreased perfusion due to sepsis and hypotension.   - Serum creatinine has started to decreased from 5.8---> 5.3   - Acid/base: metabolic acidosis has been improving after sodium bicarbonate ggt, improved from 12---> 18  - Will require serial RFP   - Avoid nephrotoxic medications   - Continue to monitor intake and output   - No need for RRT

## 2018-10-29 NOTE — ASSESSMENT & PLAN NOTE
On Cellcept and Prograf outpatient regimen  Renal transplant medicine service consulted for recs given LISBET with perinephric fat stranding on outside hospital CTAP

## 2018-10-29 NOTE — ASSESSMENT & PLAN NOTE
Treated with Vancomycin then Levaquin for presumed urosepsis at OSH  Low suspicion for sepsis at this time based on vital signs and clinical appearance  Patient with 1/4 SIRS criteria at this time (RR high 20s) with repeat CBC pending  UA on 10/26 notable for 15-20 RBC and 3+ protein with negative nitrites and negative leukocyte esterase with no information re: cultures  Repeat UA at OSH on 10/28 negative for nitrites and leukocyte esterase   Holding antibiotics for now  1L Lactated Ringer's bolus ordered on admission

## 2018-10-29 NOTE — CONSULTS
[facility]  Kidney Transplant  Consult Note    Inpatient consult to Kidney/Pancreas Transplant Medicine  Consult performed by: Tad Pardo MD  Consult ordered by: Dario Black MD  Reason for consult: Immunosuppression treatment for kidney transplant             Subjective:     History of Present Illness:   Angelica Siegel is a 48 year old woman with past medical history of  ESRD secondary to uncontrolled blood pressures after pregnancy, which was on HD for 5 years (BRADEN AVF which has been legated), DBD kidney transplant on 09/5/2013 on [facility]  with CKD stage III with a serum creatinine baseline of 1.4-1.5, on immunosuppression with tacrolimus 4 mg BID,   mg BID and prednisone 5 mg q D. She arrived to [facility] as a transferred from [facility] for further management of an acute kidney injury which increased from baseline to 6 in the setting of admission and treatment for a presumed UTI. The patient was admitted on 10/26/18 and treated with Vancomycin and Levaquin at their facility. Her symptoms began on 10/24/18 which involved general malaise and nausea progressing to fevers and chills with a temperature of 103 at home. Patient received a CT scan at [facility] that made reference to mild perinephric fat stranding in the transplanted right kidney and severe atrophy in the left kidney with no signs of obstruction. Has been presenting with uncontrolled blood glucose which is treated for DKA, with insuline and bicarbonate ggt. KTM was consulted for management of immunosuppression in the setting of LISBET, Sepsis secondary to UTI.           Ms. Siegel is a 48 y.o. year old female who is status post .    maintenance immunosuppression consists of:   Immunosuppressants (From admission, onward)    Start     Stop Route Frequency Ordered    10/29/18 1015  tacrolimus capsule 4 mg      -- Oral 2 times daily 10/29/18 0914          Interval  History:  Patient evaluated at bedside, states that she is feeling some better today, compared with yesterday.     Past Medical and Surgical History: Ms. Siegel has a past medical history of Hypertension, Kidney transplant recipient, and Post-transplant diabetes mellitus.  She has a past surgical history that includes Kidney transplant.    Past Social and Family History: Ms. Siegel Her family history is not on file.    Intake/Output - Last 3 Shifts       10/27 0700 - 10/28 0659 10/28 0700 - 10/29 0659 10/29 0700 - 10/30 0659    P.O.  480     I.V. (mL/kg)  227.1 (2.3) 1422.4 (14.3)    IV Piggyback  1000     Total Intake(mL/kg)  1707.1 (17.2) 1422.4 (14.3)    Urine (mL/kg/hr)  1650 1290 (1.6)    Stool  0     Total Output  1650 1290    Net  +57.1 +132.4           Stool Occurrence  1 x            Review of Systems   Constitutional: Positive for fatigue. Negative for chills, diaphoresis and fever.   HENT: Negative for congestion.    Eyes: Negative for visual disturbance.   Respiratory: Negative for shortness of breath.    Cardiovascular: Negative for chest pain, palpitations and leg swelling.   Gastrointestinal: Negative for abdominal distention and abdominal pain.   Endocrine: Negative for polyuria.   Genitourinary: Positive for decreased urine volume and difficulty urinating. Negative for dysuria, flank pain, hematuria and urgency.   Musculoskeletal: Negative for back pain.   Skin: Negative for color change.   Neurological: Negative for light-headedness.   Psychiatric/Behavioral: Negative for agitation and behavioral problems. The patient is nervous/anxious.      Objective:     Vital Signs (Most Recent):  Temp: 97.8 °F (36.6 °C) (10/29/18 1100)  Pulse: (!) 51 (10/29/18 1500)  Resp: 19 (10/29/18 1500)  BP: (!) 86/54 (10/29/18 1500)  SpO2: (!) 92 % (10/29/18 1500) Vital Signs (24h Range):  Temp:  [97.6 °F (36.4 °C)-98.4 °F (36.9 °C)] 97.8 °F (36.6 °C)  Pulse:  [] 51  Resp:  [19-47] 19  SpO2:  [91 %-99 %] 92  "%  BP: ()/(54-90) 86/54     Weight: 99.4 kg (219 lb 2.2 oz)  Height: 5' 2" (157.5 cm)  Body mass index is 40.08 kg/m².    Physical Exam   Constitutional: She appears well-developed. No distress.   HENT:   Head: Normocephalic and atraumatic.   Eyes: Conjunctivae and EOM are normal.   Neck: Normal range of motion. Neck supple.   Cardiovascular: Normal rate, regular rhythm, normal heart sounds and intact distal pulses.   Pulmonary/Chest: No respiratory distress. She has no wheezes.   Abdominal: Soft. Bowel sounds are normal. She exhibits no distension. There is no tenderness.   Genitourinary:   Genitourinary Comments: Sun cath in place   Musculoskeletal: Normal range of motion. She exhibits no edema or tenderness.   Neurological: No cranial nerve deficit.   Skin: Skin is warm and dry. She is not diaphoretic. No erythema.   Psychiatric:   Mild anxiety   Nursing note and vitals reviewed.      Significant Labs:  CBC:   Recent Labs   Lab 10/28/18  2300 10/29/18  0354   WBC 9.13 9.38   RBC 4.16 4.19   HGB 12.4 12.6   HCT 36.2* 35.9*   PLT 84* 91*   MCV 87 86   MCH 29.8 30.1   MCHC 34.3 35.1     BMP:   Recent Labs   Lab 10/29/18  0354 10/29/18  0822 10/29/18  1204   * 431* 222*   * 132* 135*   K 4.0 3.6 3.5    104 105   CO2 12* 18* 19*   * 99* 96*   CREATININE 5.8* 5.7* 5.3*   CALCIUM 8.4* 8.3* 8.4*     CMP:   Recent Labs   Lab 10/28/18  2300 10/29/18  0354 10/29/18  0822 10/29/18  1204   * 567* 431* 222*   CALCIUM 8.2* 8.4* 8.3* 8.4*   ALBUMIN 2.3*  --   --   --    PROT 6.1  --   --   --    * 129* 132* 135*   K 4.5 4.0 3.6 3.5   CO2 12* 12* 18* 19*    102 104 105   BUN 93* 100* 99* 96*   CREATININE 5.8* 5.8* 5.7* 5.3*   ALKPHOS 78  --   --   --    ALT 49*  --   --   --    AST 52*  --   --   --      Cardiac Markers: No results for input(s): CKMB, TROPONINT, MYOGLOBIN in the last 168 hours.  ABGs:   Recent Labs   Lab 10/28/18  4741   PH 7.277*   PCO2 23.4*   HCO3 10.9* "   POCSATURATED 95   BE -16         Assessment/Plan:     Hypomagnesemia    - With decreased levels today which was replaced   - Will need to evaluate after replacement   - Replaced PRN as required        Long-term use of immunosuppressant medication    - Continue with with Tacrolimus 4/4  - Will need to continue to monitor daily levels of tacrolimus. 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  - Will try to get information of transplant from outside hospital      LISBET (acute kidney injury)    - LISBET superimposed on CKD which has been secondary to pre- renal vs iATN secondary do decreased perfusion due to sepsis and hypotension.   - Serum creatinine has started to decreased from 5.8---> 5.3   - Acid/base: metabolic acidosis has been improving after sodium bicarbonate ggt, improved from 12---> 18  - Will require serial RFP   - Avoid nephrotoxic medications   - Continue to monitor intake and output   - No need for RRT           Tad Arriaga  Nephrology  Fellow  [facility]    Pager [phone]

## 2018-10-29 NOTE — PROCEDURES
"Bhumi Siegel is a 48 y.o. female patient.    Temp: 97.7 °F (36.5 °C) (10/28/18 2300)  Pulse: 67 (10/29/18 0200)  Resp: (!) 32 (10/29/18 0200)  BP: (!) 97/56 (10/29/18 0200)  SpO2: 97 % (10/29/18 0200)  Weight: 99.4 kg (219 lb 2.2 oz) (10/28/18 2041)  Height: 5' 2" (157.5 cm) (10/28/18 2041)       Central Line  Date/Time: 10/29/2018 3:18 AM  Performed by: Dario Black MD  Pre-operative Diagnosis: diabetic ketoacidosis  Post-operative diagnosis: diabetic ketoacidosis  Consent Done: Yes  Time out: Immediately prior to procedure a "time out" was called to verify the correct patient, procedure, equipment, support staff and site/side marked as required.  Indications: vascular access and med administration  Anesthesia: local infiltration    Anesthesia:  Local Anesthetic: lidocaine 1% with epinephrine  Anesthetic total: 4 mL  Preparation: skin prepped with ChloraPrep  Location details: right internal jugular  Catheter type: triple lumen  Catheter size: 7 Fr  Ultrasound guidance: yes  Vessel Caliber: large, compressibility normal  Needle advanced into vessel with real time Ultrasound guidance.  Guidewire confirmed in vessel.  Sterile sheath used.  Number of attempts: 1  Complications: arrhythmia  Estimated blood loss (mL): 1 mL.  Post-procedure: line sutured,  sterile dressing applied and blood return through all ports  Complications: No  Comments: Patient went into atrial fibrillation at rate of 100-110 for approximately 10 minutes after procedure and spontaneously converted.  She denied chest pain and shortness of breath.  O2 sat remained at 94-97% (unchanged since admission).  RR remained in mid-20s to low 30s (unchanged since admission).  Bedside ultrasound showed positive lung slide with comet tails and no significant B-lines.            Dario Black  10/29/2018  "

## 2018-10-29 NOTE — NURSING
Patient has one IV access that is leaking.  After several failed attempts unable to obtain new access. MD at bedside and aware.  Team will place central line.  No new orders at this time. Will continue to monitor.

## 2018-10-29 NOTE — HPI
Angelica Siegel is a 48 year old woman with past medical history of  ESRD secondary to uncontrolled blood pressures after pregnancy, which was on HD for 5 years (BRADEN AVF which has been legated), DBD kidney transplant on 09/5/2003 on [facility]  with CKD stage III with a serum creatinine baseline of 1.9-2.9, on immunosuppression with tacrolimus 4 mg BID,   mg BID and prednisone 5 mg q D. She arrived to [facility] as a transferred from [facility] for further management of an acute kidney injury which increased from baseline to 6 in the setting of admission and treatment for a presumed UTI. The patient was admitted on 10/26/18 and treated with Vancomycin and Levaquin at their facility. Her symptoms began on 10/24/18 which involved general malaise and nausea progressing to fevers and chills with a temperature of 103 at home. Patient received a CT scan at [facility] that made reference to mild perinephric fat stranding in the transplanted right kidney and severe atrophy in the left kidney with no signs of obstruction. Has been presenting with uncontrolled blood glucose which is treated for DKA, with insuline and bicarbonate ggt. KTM was consulted for management of immunosuppression in the setting of LISBET, Sepsis secondary to UTI.

## 2018-10-29 NOTE — ASSESSMENT & PLAN NOTE
- With decreased levels today which was replaced   - Will need to evaluate after replacement   - Replaced PRN as required

## 2018-10-29 NOTE — ASSESSMENT & PLAN NOTE
Possibly due to dehydration both per OSH report and confirmed on bedside ultrasound on arrival  Will assess for DKA given hyperglycemia on arrival and report of non lactic acidosis at OSH

## 2018-10-29 NOTE — H&P
[facility]  Critical Care Medicine  History & Physical    Patient Name: Bhumi Siegel  MRN: [id number]  Admission Date: 10/28/2018  Hospital Length of Stay: 0 days  Code Status: Full Code  Attending Physician: Catracho  Primary Care Provider: Primary Doctor No   Principal Problem: Acute kidney injury; dehydration; history of renal transplant    Subjective:     HPI:  48 year old  woman with hypertension, insulin dependent diabetes, history of right kidney cadaver transplant on Cellcept and Prograf, CKD stage 3, history of AV fistula status post reversal, and history of emergent caesarian delivery in 1990s transferred from [facility] for further management of an acute kidney injury with increase in creatinine from 1.7 (baseline) to 6 in the setting of admission and treatment for a presumed UTI. The patient was admitted on 10/26 and treated with Vancomycin and Levaquin at their facility. Her symptoms began on 10/24 and involved general malaise and nausea progressing to fevers and chills with a temperature of 103 at home. Patient received a CT scan at [facility] that made reference to mild perinephric fat stranding in the transplanted right kidney and severe atrophy in the left kidney with no signs of obstruction. Patient had an initial WBC count of 18.8 on admission (10/26) that improved to 14 today (10/28) prior to transfer. CBC at their facility also notable for mild thrombocytopenia < 90 for which the patient lacks any bleeding symptoms. Initial urinalysis was negative for nitrites and leukocyte esterase with 3+ protein and 15-20 RBC. Repeat urinalysis today (10/28) showed negative nitrites and negative leukocyte esterase. The patient currently lacks fevers/chills, nausea/vomiting, dysuria, and back pain. She arrived with a alvarado catheter with minimal urine output. Patient also has a history of anxiety for which she takes Lexapro daily.    Hospital/ICU Course:  10/28/18:  Admitted to MICU.     No past medical history on file.    No past surgical history on file.    Review of patient's allergies indicates:   Allergen Reactions    Iron analogues Anaphylaxis    Morphine Other (See Comments)     Burns all over once IVP    Pcn [penicillins] Rash       Family History     None        Tobacco Use    Smoking status: Not on file   Substance and Sexual Activity    Alcohol use: Not on file    Drug use: Not on file    Sexual activity: Not on file      Review of Systems   Constitutional: Positive for fatigue. Negative for chills, diaphoresis and fever.   HENT: Negative for congestion.    Eyes: Negative for visual disturbance.   Respiratory: Negative for shortness of breath.    Cardiovascular: Negative for chest pain, palpitations and leg swelling.   Gastrointestinal: Negative for abdominal distention and abdominal pain.   Endocrine: Negative for polyuria.   Genitourinary: Positive for decreased urine volume and difficulty urinating. Negative for dysuria, flank pain, hematuria and urgency.   Musculoskeletal: Negative for back pain.   Skin: Negative for color change.   Neurological: Negative for light-headedness.   Psychiatric/Behavioral: Negative for agitation and behavioral problems. The patient is nervous/anxious.      Objective:     Vital Signs (Most Recent):  Temp: 97.7 °F (36.5 °C) (10/28/18 2041)  Pulse: 65 (10/28/18 2110)  Resp: (!) 34 (10/28/18 2110)  SpO2: 95 % (10/28/18 2110) Vital Signs (24h Range):  Temp:  [97.7 °F (36.5 °C)-98.4 °F (36.9 °C)] 97.7 °F (36.5 °C)  Pulse:  [62-65] 65  Resp:  [24-34] 34  SpO2:  [93 %-95 %] 95 %  BP: (116-133)/(75-90) 133/90   Weight: 99.4 kg (219 lb 2.2 oz)  Body mass index is 40.08 kg/m².      Intake/Output Summary (Last 24 hours) at 10/28/2018 2150  Last data filed at 10/28/2018 2110  Gross per 24 hour   Intake --   Output 600 ml   Net -600 ml       Physical Exam   Constitutional: She appears well-developed. No distress.   Mildly fatigued appearing  48 year old  woman in no distress   HENT:   Head: Normocephalic and atraumatic.   Eyes: Conjunctivae and EOM are normal.   Neck: Normal range of motion. Neck supple.   Cardiovascular: Normal rate, regular rhythm, normal heart sounds and intact distal pulses.   Pulmonary/Chest: No respiratory distress. She has no wheezes.   Mildly increased respiratory effort   Abdominal: Soft. Bowel sounds are normal. She exhibits no distension. There is no tenderness.   Musculoskeletal: Normal range of motion. She exhibits no edema or tenderness.   Neurological: No cranial nerve deficit.   Skin: Skin is warm and dry. She is not diaphoretic. No erythema.   Psychiatric:   Mild anxiety   Nursing note and vitals reviewed.      Vents:     Lines/Drains/Airways     Drain                 Hemodialysis AV Fistula Left upper arm -- days         Urethral Catheter 10/28/18 2146 16 Fr. less than 1 day          Peripheral Intravenous Line                 Peripheral IV - Single Lumen 10/26/18 Right Forearm 2 days              Significant Labs:    CBC/Anemia Profile:  No results for input(s): WBC, HGB, HCT, PLT, MCV, RDW, IRON, FERRITIN, RETIC, FOLATE, [id number], OCCULTBLOOD in the last 48 hours.     Chemistries:  No results for input(s): NA, K, CL, CO2, BUN, CREATININE, CALCIUM, ALBUMIN, PROT, BILITOT, ALKPHOS, ALT, AST, GLUCOSE, MG, PHOS in the last 48 hours.      Significant Imaging: None pending    Assessment/Plan:     Renal/   History of kidney transplant    On Cellcept and Prograf, holding at this time  Renal transplant medicine service consulted     LISBET (acute kidney injury)    Possibly due to dehydration both per OSH report and confirmed on bedside ultrasound on arrival  Will assess for DKA given hyperglycemia on arrival and report of non lactic acidosis at OSH     Dehydration    Suspect due to DKA given hyperglycemia to 400 on arrival  ABG pending  Bedside ultrasound revealed nearly flat IVC, empty bladder, and no  hydronephrosis  1 liter LR bolus ordered  Continue alvarado catheter for strict ins and outs  Will recheck BMP tonight     ID   Sepsis    Unlikely  Presumed urinary source at outside hospital  Patient with 1/4 SIRS criteria at this time (RR high 20s) with repeat CBC pending  Patient treated with Vancomycin and Levaquin at OSH  Initial UA notable for 15-20 RBC and 3+ protein with negative nitrites and negative leukocyte esterase with no information re: cultures  UA from today 10/28 negative for nitrites and leukocyte esterase   Holding antibiotics for now  1L Lactated Ringer's bolus ordered     Immunology/Multi System   Immunosuppression    See above         Critical Care Daily Checklist:    A: Awake: RASS Goal/Actual Goal:    Actual:     B: Spontaneous Breathing Trial Performed?     C: SAT & SBT Coordinated?             D: Delirium: CAM-ICU     E: Early Mobility Performed? No   F: Feeding Goal:    Status:     Current Diet Order   Procedures    Diet renal      AS: Analgesia/Sedation NA   T: Thromboembolic Prophylaxis Lovenox    H: HOB > 300 Yes   U: Stress Ulcer Prophylaxis (if needed) PPI   G: Glucose Control Pending results   B: Bowel Function     I: Indwelling Catheter (Lines & Alvarado) Necessity Alvarado  PIV x 2   D: De-escalation of Antimicrobials/Pharmacotherapies None    Plan for the day/ETD Assess for DKA and treat    Code Status:  Family/Goals of Care: Full Code         Critical secondary to elevated creatinine (LISBET) with history of renal transplant and immunosuppression and history of insulin dependent diabetes.     Critical care was time spent personally by me on the following activities: development of treatment plan with patient or surrogate and bedside caregivers, discussions with consultants, evaluation of patient's response to treatment, examination of patient, ordering and performing treatments and interventions, ordering and review of laboratory studies, ordering and review of radiographic studies, pulse  oximetry, re-evaluation of patient's condition. This critical care time did not overlap with that of any other provider or involve time for any procedures.     Dario Black MD  Critical Care Medicine  [facility]

## 2018-10-30 PROBLEM — E11.10 DKA (DIABETIC KETOACIDOSES): Status: ACTIVE | Noted: 2018-10-30

## 2018-10-30 PROBLEM — E83.42 HYPOMAGNESEMIA: Status: RESOLVED | Noted: 2018-10-29 | Resolved: 2018-10-30

## 2018-10-30 PROBLEM — Z79.60 LONG-TERM USE OF IMMUNOSUPPRESSANT MEDICATION: Status: RESOLVED | Noted: 2018-10-29 | Resolved: 2018-10-30

## 2018-10-30 PROBLEM — E86.0 DEHYDRATION: Status: RESOLVED | Noted: 2018-10-28 | Resolved: 2018-10-30

## 2018-10-30 LAB
ANION GAP SERPL CALC-SCNC: 10 MMOL/L
ANION GAP SERPL CALC-SCNC: 12 MMOL/L
ANION GAP SERPL CALC-SCNC: 9 MMOL/L
ANION GAP SERPL CALC-SCNC: 9 MMOL/L
BASOPHILS # BLD AUTO: 0.02 K/UL
BASOPHILS NFR BLD: 0.2 %
BUN SERPL-MCNC: 89 MG/DL
BUN SERPL-MCNC: 96 MG/DL
BUN SERPL-MCNC: 99 MG/DL
BUN SERPL-MCNC: 99 MG/DL
CALCIUM SERPL-MCNC: 8.9 MG/DL
CALCIUM SERPL-MCNC: 8.9 MG/DL
CALCIUM SERPL-MCNC: 9 MG/DL
CALCIUM SERPL-MCNC: 9.2 MG/DL
CHLORIDE SERPL-SCNC: 106 MMOL/L
CHLORIDE SERPL-SCNC: 107 MMOL/L
CHLORIDE SERPL-SCNC: 107 MMOL/L
CHLORIDE SERPL-SCNC: 109 MMOL/L
CO2 SERPL-SCNC: 17 MMOL/L
CO2 SERPL-SCNC: 18 MMOL/L
CO2 SERPL-SCNC: 18 MMOL/L
CO2 SERPL-SCNC: 19 MMOL/L
CREAT SERPL-MCNC: 4.7 MG/DL
CREAT SERPL-MCNC: 5 MG/DL
CREAT SERPL-MCNC: 5.1 MG/DL
CREAT SERPL-MCNC: 5.2 MG/DL
DIFFERENTIAL METHOD: ABNORMAL
EOSINOPHIL # BLD AUTO: 0 K/UL
EOSINOPHIL NFR BLD: 0.2 %
ERYTHROCYTE [DISTWIDTH] IN BLOOD BY AUTOMATED COUNT: 12.7 %
EST. GFR  (AFRICAN AMERICAN): 10.4 ML/MIN/1.73 M^2
EST. GFR  (AFRICAN AMERICAN): 10.7 ML/MIN/1.73 M^2
EST. GFR  (AFRICAN AMERICAN): 10.9 ML/MIN/1.73 M^2
EST. GFR  (AFRICAN AMERICAN): 11.8 ML/MIN/1.73 M^2
EST. GFR  (NON AFRICAN AMERICAN): 10.2 ML/MIN/1.73 M^2
EST. GFR  (NON AFRICAN AMERICAN): 9 ML/MIN/1.73 M^2
EST. GFR  (NON AFRICAN AMERICAN): 9.3 ML/MIN/1.73 M^2
EST. GFR  (NON AFRICAN AMERICAN): 9.5 ML/MIN/1.73 M^2
GLUCOSE SERPL-MCNC: 160 MG/DL
GLUCOSE SERPL-MCNC: 205 MG/DL
GLUCOSE SERPL-MCNC: 228 MG/DL
GLUCOSE SERPL-MCNC: 289 MG/DL
HCT VFR BLD AUTO: 35.3 %
HGB BLD-MCNC: 12.3 G/DL
IMM GRANULOCYTES # BLD AUTO: 0.14 K/UL
IMM GRANULOCYTES NFR BLD AUTO: 1.4 %
LYMPHOCYTES # BLD AUTO: 1.2 K/UL
LYMPHOCYTES NFR BLD: 11.5 %
MAGNESIUM SERPL-MCNC: 2 MG/DL
MAGNESIUM SERPL-MCNC: 2.2 MG/DL
MAGNESIUM SERPL-MCNC: 2.2 MG/DL
MAGNESIUM SERPL-MCNC: 2.6 MG/DL
MCH RBC QN AUTO: 29.5 PG
MCHC RBC AUTO-ENTMCNC: 34.8 G/DL
MCV RBC AUTO: 85 FL
MONOCYTES # BLD AUTO: 0.7 K/UL
MONOCYTES NFR BLD: 7.3 %
NEUTROPHILS # BLD AUTO: 8.1 K/UL
NEUTROPHILS NFR BLD: 79.4 %
NRBC BLD-RTO: 0 /100 WBC
PHOSPHATE SERPL-MCNC: 2.9 MG/DL
PHOSPHATE SERPL-MCNC: 3.1 MG/DL
PHOSPHATE SERPL-MCNC: 3.4 MG/DL
PHOSPHATE SERPL-MCNC: 3.5 MG/DL
PLATELET # BLD AUTO: 124 K/UL
PMV BLD AUTO: 11.1 FL
POCT GLUCOSE: 138 MG/DL (ref 70–110)
POCT GLUCOSE: 150 MG/DL (ref 70–110)
POCT GLUCOSE: 152 MG/DL (ref 70–110)
POCT GLUCOSE: 168 MG/DL (ref 70–110)
POCT GLUCOSE: 169 MG/DL (ref 70–110)
POCT GLUCOSE: 189 MG/DL (ref 70–110)
POCT GLUCOSE: 191 MG/DL (ref 70–110)
POCT GLUCOSE: 206 MG/DL (ref 70–110)
POCT GLUCOSE: 213 MG/DL (ref 70–110)
POCT GLUCOSE: 243 MG/DL (ref 70–110)
POCT GLUCOSE: 244 MG/DL (ref 70–110)
POCT GLUCOSE: 259 MG/DL (ref 70–110)
POCT GLUCOSE: 267 MG/DL (ref 70–110)
POCT GLUCOSE: 274 MG/DL (ref 70–110)
POCT GLUCOSE: 283 MG/DL (ref 70–110)
POCT GLUCOSE: 283 MG/DL (ref 70–110)
POTASSIUM SERPL-SCNC: 3.5 MMOL/L
POTASSIUM SERPL-SCNC: 4.1 MMOL/L
POTASSIUM SERPL-SCNC: 4.2 MMOL/L
POTASSIUM SERPL-SCNC: 4.8 MMOL/L
RBC # BLD AUTO: 4.17 M/UL
SODIUM SERPL-SCNC: 134 MMOL/L
SODIUM SERPL-SCNC: 135 MMOL/L
SODIUM SERPL-SCNC: 135 MMOL/L
SODIUM SERPL-SCNC: 137 MMOL/L
TACROLIMUS BLD-MCNC: 8.2 NG/ML
WBC # BLD AUTO: 10.13 K/UL

## 2018-10-30 PROCEDURE — 25000003 PHARM REV CODE 250: Performed by: STUDENT IN AN ORGANIZED HEALTH CARE EDUCATION/TRAINING PROGRAM

## 2018-10-30 PROCEDURE — 80048 BASIC METABOLIC PNL TOTAL CA: CPT | Mod: 91

## 2018-10-30 PROCEDURE — 63600175 PHARM REV CODE 636 W HCPCS: Performed by: STUDENT IN AN ORGANIZED HEALTH CARE EDUCATION/TRAINING PROGRAM

## 2018-10-30 PROCEDURE — 63600175 PHARM REV CODE 636 W HCPCS: Performed by: INTERNAL MEDICINE

## 2018-10-30 PROCEDURE — 25000003 PHARM REV CODE 250: Performed by: INTERNAL MEDICINE

## 2018-10-30 PROCEDURE — 36415 COLL VENOUS BLD VENIPUNCTURE: CPT

## 2018-10-30 PROCEDURE — 99233 SBSQ HOSP IP/OBS HIGH 50: CPT | Mod: ,,, | Performed by: INTERNAL MEDICINE

## 2018-10-30 PROCEDURE — 94761 N-INVAS EAR/PLS OXIMETRY MLT: CPT

## 2018-10-30 PROCEDURE — 20000000 HC ICU ROOM

## 2018-10-30 PROCEDURE — 11000001 HC ACUTE MED/SURG PRIVATE ROOM

## 2018-10-30 PROCEDURE — 97165 OT EVAL LOW COMPLEX 30 MIN: CPT

## 2018-10-30 PROCEDURE — 84100 ASSAY OF PHOSPHORUS: CPT

## 2018-10-30 PROCEDURE — 83735 ASSAY OF MAGNESIUM: CPT

## 2018-10-30 PROCEDURE — 83735 ASSAY OF MAGNESIUM: CPT | Mod: 91

## 2018-10-30 PROCEDURE — 80197 ASSAY OF TACROLIMUS: CPT

## 2018-10-30 PROCEDURE — 85025 COMPLETE CBC W/AUTO DIFF WBC: CPT

## 2018-10-30 RX ORDER — SODIUM BICARBONATE 650 MG/1
1300 TABLET ORAL 2 TIMES DAILY
Status: DISCONTINUED | OUTPATIENT
Start: 2018-10-30 | End: 2018-10-31

## 2018-10-30 RX ORDER — TACROLIMUS 1 MG/1
3 CAPSULE ORAL EVERY 12 HOURS
Status: ON HOLD | COMMUNITY
End: 2018-11-02 | Stop reason: HOSPADM

## 2018-10-30 RX ORDER — SODIUM BICARBONATE 650 MG/1
650 TABLET ORAL 2 TIMES DAILY
Status: CANCELLED | OUTPATIENT
Start: 2018-10-30

## 2018-10-30 RX ORDER — TACROLIMUS 1 MG/1
3 CAPSULE ORAL 2 TIMES DAILY
Status: DISCONTINUED | OUTPATIENT
Start: 2018-10-30 | End: 2018-10-31

## 2018-10-30 RX ORDER — SODIUM BICARBONATE 650 MG/1
650 TABLET ORAL 2 TIMES DAILY
Status: DISCONTINUED | OUTPATIENT
Start: 2018-10-30 | End: 2018-10-30

## 2018-10-30 RX ORDER — PREDNISONE 5 MG/1
5 TABLET ORAL DAILY
COMMUNITY

## 2018-10-30 RX ORDER — MYCOPHENOLATE MOFETIL 500 MG/1
500 TABLET ORAL 2 TIMES DAILY
Status: ON HOLD | COMMUNITY
End: 2018-11-02 | Stop reason: HOSPADM

## 2018-10-30 RX ORDER — ESCITALOPRAM OXALATE 20 MG/1
20 TABLET ORAL DAILY
Status: ON HOLD | COMMUNITY
End: 2018-11-02 | Stop reason: HOSPADM

## 2018-10-30 RX ORDER — INSULIN ASPART 100 [IU]/ML
0-5 INJECTION, SOLUTION INTRAVENOUS; SUBCUTANEOUS EVERY 4 HOURS PRN
Status: DISCONTINUED | OUTPATIENT
Start: 2018-10-30 | End: 2018-11-02 | Stop reason: HOSPADM

## 2018-10-30 RX ORDER — INSULIN ASPART 100 [IU]/ML
6 INJECTION, SOLUTION INTRAVENOUS; SUBCUTANEOUS
Status: DISCONTINUED | OUTPATIENT
Start: 2018-10-30 | End: 2018-11-02 | Stop reason: HOSPADM

## 2018-10-30 RX ORDER — GLUCAGON 1 MG
1 KIT INJECTION
Status: DISCONTINUED | OUTPATIENT
Start: 2018-10-30 | End: 2018-11-02 | Stop reason: HOSPADM

## 2018-10-30 RX ORDER — POTASSIUM CHLORIDE 20 MEQ/15ML
40 SOLUTION ORAL ONCE
Status: COMPLETED | OUTPATIENT
Start: 2018-10-30 | End: 2018-10-30

## 2018-10-30 RX ORDER — MYCOPHENOLATE MOFETIL 250 MG/1
500 CAPSULE ORAL 2 TIMES DAILY
Status: DISCONTINUED | OUTPATIENT
Start: 2018-10-30 | End: 2018-10-30

## 2018-10-30 RX ORDER — NAPROXEN SODIUM 220 MG/1
81 TABLET, FILM COATED ORAL DAILY
COMMUNITY
End: 2022-08-01

## 2018-10-30 RX ADMIN — PREDNISONE 5 MG: 2.5 TABLET ORAL at 08:10

## 2018-10-30 RX ADMIN — INSULIN ASPART 3 UNITS: 100 INJECTION, SOLUTION INTRAVENOUS; SUBCUTANEOUS at 12:10

## 2018-10-30 RX ADMIN — ESCITALOPRAM OXALATE 10 MG: 10 TABLET ORAL at 08:10

## 2018-10-30 RX ADMIN — INSULIN ASPART 6 UNITS: 100 INJECTION, SOLUTION INTRAVENOUS; SUBCUTANEOUS at 12:10

## 2018-10-30 RX ADMIN — HEPARIN SODIUM 5000 UNITS: 5000 INJECTION, SOLUTION INTRAVENOUS; SUBCUTANEOUS at 08:10

## 2018-10-30 RX ADMIN — INSULIN ASPART 6 UNITS: 100 INJECTION, SOLUTION INTRAVENOUS; SUBCUTANEOUS at 05:10

## 2018-10-30 RX ADMIN — INSULIN ASPART 2 UNITS: 100 INJECTION, SOLUTION INTRAVENOUS; SUBCUTANEOUS at 10:10

## 2018-10-30 RX ADMIN — POTASSIUM CHLORIDE 40 MEQ: 20 SOLUTION ORAL at 06:10

## 2018-10-30 RX ADMIN — TACROLIMUS 3 MG: 1 CAPSULE ORAL at 06:10

## 2018-10-30 RX ADMIN — TACROLIMUS 4 MG: 1 CAPSULE ORAL at 08:10

## 2018-10-30 RX ADMIN — SODIUM BICARBONATE 650 MG: 650 TABLET ORAL at 10:10

## 2018-10-30 RX ADMIN — SODIUM BICARBONATE 650 MG TABLET 1300 MG: at 08:10

## 2018-10-30 RX ADMIN — CEFTRIAXONE SODIUM 1 G: 1 INJECTION, POWDER, FOR SOLUTION INTRAMUSCULAR; INTRAVENOUS at 10:10

## 2018-10-30 RX ADMIN — INSULIN DETEMIR 30 UNITS: 100 INJECTION, SOLUTION SUBCUTANEOUS at 08:10

## 2018-10-30 RX ADMIN — INSULIN ASPART 6 UNITS: 100 INJECTION, SOLUTION INTRAVENOUS; SUBCUTANEOUS at 10:10

## 2018-10-30 RX ADMIN — INSULIN ASPART 2 UNITS: 100 INJECTION, SOLUTION INTRAVENOUS; SUBCUTANEOUS at 08:10

## 2018-10-30 RX ADMIN — SODIUM CHLORIDE 7 UNITS/HR: 9 INJECTION, SOLUTION INTRAVENOUS at 12:10

## 2018-10-30 RX ADMIN — INSULIN ASPART 2 UNITS: 100 INJECTION, SOLUTION INTRAVENOUS; SUBCUTANEOUS at 05:10

## 2018-10-30 NOTE — SUBJECTIVE & OBJECTIVE
Subjective:   History of Present Illness:  Angelica Siegel is a 48 year old woman with past medical history of  ESRD secondary to uncontrolled blood pressures after pregnancy, which was on HD for 5 years (BRADEN AVF which has been legated), DBD kidney transplant on 09/5/2013 on [facility] at [facility]  with CKD stage III with a serum creatinine baseline of 1.4-1.5, on immunosuppression with tacrolimus 4 mg BID,   mg BID and prednisone 5 mg q D. She arrived to [facility] as a transferred from [facility] for further management of an acute kidney injury which increased from baseline to 6 in the setting of admission and treatment for a presumed UTI. The patient was admitted on 10/26/18 and treated with Vancomycin and Levaquin at their facility. Her symptoms began on 10/24/18 which involved general malaise and nausea progressing to fevers and chills with a temperature of 103 at home. Patient received a CT scan at [facility] that made reference to mild perinephric fat stranding in the transplanted right kidney and severe atrophy in the left kidney with no signs of obstruction. Has been presenting with uncontrolled blood glucose which is treated for DKA, with insuline and bicarbonate ggt. KTM was consulted for management of immunosuppression in the setting of LISBET, Sepsis secondary to UTI.     Ms. Siegel is a 48 y.o. year old female who is status post .    Her maintenance immunosuppression consists of:   Immunosuppressants (From admission, onward)    Start     Stop Route Frequency Ordered    10/29/18 1015  tacrolimus capsule 4 mg      -- Oral 2 times daily 10/29/18 0914          Hospital Course:  No notes on file    Interval History:  Patient evaluated at bedside, has been feeling better today. No significant event overnight. Planing on starting diet today. Off IVFs and insulin ggt. Denies any fever, chills, SOB, nausea, vomiting or diarrhea.      Past Medical, Surgical, Family, and  "Social History:   Unchanged from H&P.    Scheduled Meds:   cefTRIAXone (ROCEPHIN) IVPB  1 g Intravenous Q24H    escitalopram oxalate  10 mg Oral Daily    heparin (porcine)  5,000 Units Subcutaneous Q12H    insulin aspart U-100  6 Units Subcutaneous TIDWM    insulin detemir U-100  30 Units Subcutaneous Daily    predniSONE  5 mg Oral Daily    sodium bicarbonate  650 mg Oral BID    tacrolimus  4 mg Oral BID     Continuous Infusions:  PRN Meds:acetaminophen, glucose, glucose, ondansetron, sodium chloride 0.9%    Intake/Output - Last 3 Shifts       10/28 0700 - 10/29 0659 10/29 0700 - 10/30 0659 10/30 0700 - 10/31 0659    P.O. 480      I.V. (mL/kg) 227.1 (2.3) 1476.9 (14.9)     IV Piggyback 1000      Total Intake(mL/kg) 1707.1 (17.2) 1476.9 (14.9)     Urine (mL/kg/hr) 1650 2990 (1.3)     Stool 0 1     Total Output 1650 2991     Net +57.1 -1514.2            Stool Occurrence 1 x             Review of Systems   Objective:     Vital Signs (Most Recent):  Temp: 97.4 °F (36.3 °C) (10/29/18 2300)  Pulse: 72 (10/30/18 0600)  Resp: (!) 27 (10/30/18 0600)  BP: 132/76 (10/30/18 0600)  SpO2: 97 % (10/30/18 0630) Vital Signs (24h Range):  Temp:  [97.4 °F (36.3 °C)-97.9 °F (36.6 °C)] 97.4 °F (36.3 °C)  Pulse:  [51-72] 72  Resp:  [15-34] 27  SpO2:  [92 %-98 %] 97 %  BP: ()/(54-88) 132/76     Weight: 99.4 kg (219 lb 2.2 oz)  Height: 5' 2" (157.5 cm)  Body mass index is 40.08 kg/m².    Physical Exam   Constitutional: She appears well-developed. No distress.   HENT:   Head: Normocephalic and atraumatic.   Eyes: Conjunctivae and EOM are normal.   Neck: Normal range of motion. Neck supple.   Cardiovascular: Normal rate, regular rhythm, normal heart sounds and intact distal pulses.   Pulmonary/Chest: No respiratory distress. She has no wheezes.   Abdominal: Soft. Bowel sounds are normal. She exhibits no distension. There is no tenderness.   Genitourinary:   Genitourinary Comments: Sun cath in place   Musculoskeletal: Normal " range of motion. She exhibits no edema or tenderness.   Neurological: No cranial nerve deficit.   Skin: Skin is warm and dry. She is not diaphoretic. No erythema.   Psychiatric:   Mild anxiety   Nursing note and vitals reviewed.      Laboratory:  CBC:   Recent Labs   Lab 10/28/18  2300 10/29/18  0354 10/30/18  0422   WBC 9.13 9.38 10.13   RBC 4.16 4.19 4.17   HGB 12.4 12.6 12.3   HCT 36.2* 35.9* 35.3*   PLT 84* 91* 124*   MCV 87 86 85   MCH 29.8 30.1 29.5   MCHC 34.3 35.1 34.8     BMP:   Recent Labs   Lab 10/29/18  1935 10/29/18  2323 10/30/18  0422   * 289* 160*   * 135* 137   K 4.5 4.1 3.5    106 109   CO2 19* 17* 19*   BUN 95* 99* 99*   CREATININE 5.2* 5.2* 5.0*   CALCIUM 8.9 8.9 9.0     CMP:   Recent Labs   Lab 10/28/18  2300  10/29/18  1935 10/29/18  2323 10/30/18  0422   *   < > 249* 289* 160*   CALCIUM 8.2*   < > 8.9 8.9 9.0   ALBUMIN 2.3*  --   --   --   --    PROT 6.1  --   --   --   --    *   < > 134* 135* 137   K 4.5   < > 4.5 4.1 3.5   CO2 12*   < > 19* 17* 19*      < > 106 106 109   BUN 93*   < > 95* 99* 99*   CREATININE 5.8*   < > 5.2* 5.2* 5.0*   ALKPHOS 78  --   --   --   --    ALT 49*  --   --   --   --    AST 52*  --   --   --   --     < > = values in this interval not displayed.     ABGs:   Recent Labs   Lab 10/28/18  2317   PH 7.277*   PCO2 23.4*   HCO3 10.9*   POCSATURATED 95   BE -16

## 2018-10-30 NOTE — ASSESSMENT & PLAN NOTE
- LISBET superimposed on CKD which has been secondary to pre- renal vs iATN secondary do decreased perfusion due to sepsis and hypotension.   - Serum creatinine continues on downward trend from 5.2---> 5.1 today    - Acid/base: non anion gap metabolic acidosis which mostly secondary to DKA and LISBET has been improving 18---> 19. Will change sodium bicarbonate 1,300 mg q BID    - Avoid nephrotoxic medications   - Continue to monitor intake and output   - No need for RRT

## 2018-10-30 NOTE — ASSESSMENT & PLAN NOTE
Renal transplant recommends no dialysis at this time  Renal transplant recommends tacrolimus 4 mg BID,   mg BID and prednisone 5 mg daily (all started)  Baseline creatinine 1.5

## 2018-10-30 NOTE — PT/OT/SLP EVAL
Occupational Therapy   Evaluation and Discharge Note    Name: Bhumi Siegel  MRN: [id number]  Admitting Diagnosis:  <principal problem not specified>      Recommendations:     Discharge Recommendations: home  Discharge Equipment Recommendations:  none  Barriers to discharge:  None    History:     Occupational Profile:  Living Environment: Pt lives with sister in a H with 3 ANTOINETTE B handrails, tub shower.   Previous level of function: fully independent, no AD or AE. Cleans offices for work.  Roles and Routines: sister, employee  Equipment Owned:  none  Assistance upon Discharge: sister can assist upon d/c if she should need it.     Past Medical History:   Diagnosis Date    Hypertension     Kidney transplant recipient     Post-transplant diabetes mellitus        Past Surgical History:   Procedure Laterality Date    KIDNEY TRANSPLANT         Subjective     Chief Complaint: Pt is looking forward to getting central line out.  Patient/Family stated goals: No goals set, pt is functioning at OF  Communicated with: RN prior to session.  Pain/Comfort:  · Pain Rating 1: 0/10    Patients cultural, spiritual, Jewish conflicts given the current situation: none    Objective:     Patient found with: telemetry, central line    General Precautions: Standard,     Orthopedic Precautions:N/A   Braces: N/A     Occupational Performance:    Bed Mobility:    · Patient completed Rolling/Turning to Left with  independence  · Patient completed Rolling/Turning to Right with independence  · Patient completed Scooting/Bridging with independence  · Patient completed Supine to Sit with independence  · Patient completed Sit to Supine with independence    Functional Mobility/Transfers:  · Patient completed Sit <> Stand Transfer with independence  with  no assistive device   · Patient completed Toilet Transfer Step Transfer technique with independence with  no AD  · Functional Mobility: Pt ambulated to restroom, performed toilet transfer with  "independence and no device. She is able make full turns without LOB which she had to do because of telemetry lines.     Activities of Daily Living:  · Feeding:  independence    · Grooming: independence    · Upper Body Dressing: independence (limited only by telemetry)  · Lower Body Dressing: independence    · Toileting: independence      Cognitive/Visual Perceptual:  Cognitive/Psychosocial Skills:  -       Oriented to: Person, Place, Time and Situation   -       Follows Commands/attention:Follows one-step commands  Visual/Perceptual:      -Intact      Physical Exam:  Balance: -       good  Upper Extremity Range of Motion:     -       Right Upper Extremity: WFL    -       Left Upper Extremity: WFL      Upper Extremity Strength:    -       Right Upper Extremity: WFL  -       Left Upper Extremity: WFL    Fine Motor Coordination:    -       Intact  Gross motor coordination:   WFL  Neurological:  Intact    Patient left HOB elevated with all lines intact and call button in reach    AMPA 6 Click:  [facility] Total Score: 24    Treatment & Education:  Pt educated on role of OT in acute care setting  Education:    Assessment:     Bhumi Siegel is a 48 y.o. female with a medical diagnosis of Sepsis. At this time, patient is functioning at their prior level of function and does not require further acute OT services.     Clinical Decision Makin.  OT Low:  "Pt evaluation falls under low complexity for evaluation coding due to performance deficits noted in 1-3 areas as stated above and 0 co-morbities affecting current functional status. Data obtained from problem focused assessments. No modifications or assistance was required for completion of evaluation. Only brief occupational profile and history review completed."     Plan:     During this hospitalization, patient does not require further acute OT services.  Please re-consult if situation changes.    · Plan of Care Reviewed with: patient    This Plan of care has been " discussed with the patient who was involved in its development and understands and is in agreement with the identified goals and treatment plan    GOALS:   Multidisciplinary Problems     Occupational Therapy Goals     Not on file          Multidisciplinary Problems (Resolved)        Problem: Occupational Therapy Goal    Goal Priority Disciplines Outcome Interventions   Occupational Therapy Goal   (Resolved)     OT, PT/OT Outcome(s) achieved    Description:  No acute needs, d/c home once medically appropriate                      Time Tracking:     OT Date of Treatment: 10/30/18  OT Start Time: 1610  OT Stop Time: 1619  OT Total Time (min): 9 min    Billable Minutes:Evaluation 9    CAPRI Melendez  10/30/2018

## 2018-10-30 NOTE — ASSESSMENT & PLAN NOTE
- Currently insulin ggt was hold and started on Insulin Aspart and Determir   - Managed per critical care

## 2018-10-30 NOTE — ASSESSMENT & PLAN NOTE
Improving; creatinine at 5 from 6 on admission  Baseline Cr 1.5 with CKD Stage 3  Diuresis approximately 125/hr x 24 hours (3 liters)  Renal transplant recommends no acute interventions  Continue PO hydration  BMPs changed to q12h

## 2018-10-30 NOTE — RESIDENT HANDOFF
Handoff     Primary Team: [facility]ITICAL CARE MEDICINE Room Number: 6090/6090 A     Patient Name: Bhumi Siegel MRN: [id number]     Date of Birth: [birth year] Allergies: Iron analogues; Morphine; and Pcn [penicillins]     Age: 48 y.o. Admit Date: 10/28/2018     Sex: female  BMI: Body mass index is 40.08 kg/m².     Code Status: Full Code        Illness Level (current clinical status): Watcher - No    Reason for Admission: DKA with sepsis    Brief HPI (pertinent PMH and diagnosis or differential diagnosis):   49yo F with HTN, IDDM, s/p kidney transplant on cellcept and prograf presenting for LISBET in setting of presumed UTI sepsis, rise of Cr from 1.7 to 6 and transferred from [facility]. Initially had fevers (103F) and chills at the OSH and WBC of 18.8. Pt initially had Glu of 402 and was started on insuline drip.       Hospital Course (updated, brief assessment by system or problem, significant events): Pt started on ceftriaxone and moved from insulin drip to subcutaneous insulin. Plan for step down.    Tasks (specific, using if-then statements): Continue Abx treatment and insulin regimen.     Contingency Plan (special circumstances anticipated and plan):   If decompensates and MAP fall below     Estimated Discharge Date: unknown    Discharge Disposition: Home or Self Care    Mentored By: NORMA Linares MD

## 2018-10-30 NOTE — PROGRESS NOTES
"[facility]  Adult Nutrition  Education Note    Diet Education: Diabetes  Time Spent: 15 min  Learners: Pt      Nutrition Education provided with handouts: Meal Planning Using the Plate Method    Diabetes Medications: insulin    Comments: Per glycemic index committee protocol, diabetes education provided for HbA1c of 9.5. Pt reports she has received no "formal" education, just a food list from MD. Provided and explained handout detailing sources of carbohydrates, appropriate serving sizes, and the plate method for meal planning. Pt voiced understanding. All questions and concerns answered.      Follow-Up: 1x weekly    Please re-consult as needed.      "

## 2018-10-30 NOTE — SUBJECTIVE & OBJECTIVE
Interval History/Significant Events:   No events overnight.  Patient sitting upright in chair this morning.  Patient reporting that she feels much better and that her shortness of breath on presentation is nearly resolved.    Review of Systems     Objective:     Vital Signs (Most Recent):  Temp: 97.4 °F (36.3 °C) (10/29/18 2300)  Pulse: 72 (10/30/18 0600)  Resp: (!) 27 (10/30/18 0600)  BP: 132/76 (10/30/18 0600)  SpO2: 97 % (10/30/18 0630) Vital Signs (24h Range):  Temp:  [97.4 °F (36.3 °C)-97.9 °F (36.6 °C)] 97.4 °F (36.3 °C)  Pulse:  [51-72] 72  Resp:  [15-34] 27  SpO2:  [92 %-98 %] 97 %  BP: ()/(54-88) 132/76   Weight: 99.4 kg (219 lb 2.2 oz)  Body mass index is 40.08 kg/m².      Intake/Output Summary (Last 24 hours) at 10/30/2018 0725  Last data filed at 10/30/2018 0619  Gross per 24 hour   Intake 1326.85 ml   Output 2791 ml   Net -1464.15 ml       Physical Exam   Constitutional: She is oriented to person, place, and time. She appears well-developed and well-nourished. No distress.   HENT:   Head: Normocephalic and atraumatic.   Eyes: Conjunctivae and EOM are normal.   Neck: Normal range of motion. Neck supple.   Cardiovascular: Normal rate, regular rhythm, normal heart sounds and intact distal pulses.   Pulmonary/Chest: Effort normal and breath sounds normal. No stridor. No respiratory distress. She has no wheezes. She has no rales.   Abdominal: Soft. Bowel sounds are normal. She exhibits no distension. There is no tenderness.   Musculoskeletal: Normal range of motion. She exhibits no edema.   Neurological: She is alert and oriented to person, place, and time. No cranial nerve deficit.   Skin: Skin is warm and dry. She is not diaphoretic.       Vents:     Lines/Drains/Airways     Central Venous Catheter Line                 Percutaneous Central Line Insertion/Assessment - triple lumen  10/29/18 0244 left internal jugular 1 day          Drain                 Hemodialysis AV Fistula Left upper arm -- days          Urethral Catheter 10/28/18 2146 16 Fr. 1 day              Significant Labs:    CBC/Anemia Profile:  Recent Labs   Lab 10/28/18  2300 10/29/18  0354 10/30/18  0422   WBC 9.13 9.38 10.13   HGB 12.4 12.6 12.3   HCT 36.2* 35.9* 35.3*   PLT 84* 91* 124*   MCV 87 86 85   RDW 12.2 12.2 12.7        Chemistries:  Recent Labs   Lab 10/28/18  2300  10/29/18  1935 10/29/18  2323 10/30/18  0422   *   < > 134* 135* 137   K 4.5   < > 4.5 4.1 3.5      < > 106 106 109   CO2 12*   < > 19* 17* 19*   BUN 93*   < > 95* 99* 99*   CREATININE 5.8*   < > 5.2* 5.2* 5.0*   CALCIUM 8.2*   < > 8.9 8.9 9.0   ALBUMIN 2.3*  --   --   --   --    PROT 6.1  --   --   --   --    BILITOT 0.4  --   --   --   --    ALKPHOS 78  --   --   --   --    ALT 49*  --   --   --   --    AST 52*  --   --   --   --    MG  --    < > 2.5 2.2 2.2   PHOS 4.2   < > 3.4 3.5 3.4    < > = values in this interval not displayed.     Significant Imaging:  No new imaging

## 2018-10-30 NOTE — ASSESSMENT & PLAN NOTE
- Mostly secondary to UTI which would continue with Rocephin   - UC with >100,000 colonies which mostly presumptive E. Coli

## 2018-10-30 NOTE — PLAN OF CARE
Problem: Occupational Therapy Goal  Goal: Occupational Therapy Goal  No acute needs, d/c home once medically appropriate    Outcome: Outcome(s) achieved Date Met: 10/30/18  No acute OT needs  CAPRI Rainey  10/30/2018  Rehab Services

## 2018-10-30 NOTE — ASSESSMENT & PLAN NOTE
- ESRD secondary to uncontrolled blood pressures after pregnancy, which was on HD for 5 years (BRADEN AVF which has been legated), DBD kidney transplant on 09/5/2013 on [facility]   - CKD stage III/IV with a serum creatinine baseline of 1.9-2.9  - Immunosuppression with tacrolimus 4 mg BID,   mg BID and prednisone 5 mg q D.

## 2018-10-30 NOTE — PLAN OF CARE
Problem: Patient Care Overview  Goal: Plan of Care Review  Outcome: Ongoing (interventions implemented as appropriate)  Patient AAOx4. Plan of care and all safety precautions maintained and discussed. Vital signs stable throughout the shift. HR SB-SR on tele. Insulin gtt titrated per nomogram. Patient remains free from injury/falls. No acute events throughout the shift. Call bell in reach. Encouraged to call for assistance. Verbalized understanding. Will continue to monitor.

## 2018-10-30 NOTE — PROGRESS NOTES
[facility]  Kidney Transplant  Progress Note      Reason for Follow-up: Reassessment of  recipient and management of immunosuppression.      Subjective:   History of Present Illness:  Angelica Siegel is a 48 year old woman with past medical history of  ESRD secondary to uncontrolled blood pressures after pregnancy, which was on HD for 5 years (BRADEN AVF which has been legated), DBD kidney transplant on 09/5/2013 on [facility]  with CKD stage III with a serum creatinine baseline of 1.4-1.5, on immunosuppression with tacrolimus 4 mg BID,   mg BID and prednisone 5 mg q D. [place] [facility] [place] [facility] for further management of an acute kidney injury which increased from baseline to 6 in the setting of admission and treatment for a presumed UTI. The patient was admitted on 10/26/18 and treated with Vancomycin and Levaquin at their facility. Her symptoms began on 10/24/18 which involved general malaise and nausea progressing to fevers and chills with a temperature of 103 at home. Patient received a CT scan at [facility] that made reference to mild perinephric fat stranding in the transplanted right kidney and severe atrophy in the left kidney with no signs of obstruction. Has been presenting with uncontrolled blood glucose which is treated for DKA, with insuline and bicarbonate ggt. KTM was consulted for management of immunosuppression in the setting of LISBET, Sepsis secondary to UTI.     Ms. Siegel is a 48 y.o. year old female who is status post .    Her maintenance immunosuppression consists of:   Immunosuppressants (From admission, onward)    Start     Stop Route Frequency Ordered    10/29/18 1015  tacrolimus capsule 4 mg      -- Oral 2 times daily 10/29/18 0914          Hospital Course:  No notes on file    Interval History:  Patient evaluated at bedside, has been feeling better today. No significant event overnight.  "Planing on starting diet today. Off IVFs and insulin ggt. Denies any fever, chills, SOB, nausea, vomiting or diarrhea.      Past Medical, Surgical, Family, and Social History:   Unchanged from H&P.    Scheduled Meds:   cefTRIAXone (ROCEPHIN) IVPB  1 g Intravenous Q24H    escitalopram oxalate  10 mg Oral Daily    heparin (porcine)  5,000 Units Subcutaneous Q12H    insulin aspart U-100  6 Units Subcutaneous TIDWM    insulin detemir U-100  30 Units Subcutaneous Daily    predniSONE  5 mg Oral Daily    sodium bicarbonate  650 mg Oral BID    tacrolimus  4 mg Oral BID     Continuous Infusions:  PRN Meds:acetaminophen, glucose, glucose, ondansetron, sodium chloride 0.9%    Intake/Output - Last 3 Shifts       10/28 0700 - 10/29 0659 10/29 0700 - 10/30 0659 10/30 0700 - 10/31 0659    P.O. 480      I.V. (mL/kg) 227.1 (2.3) 1476.9 (14.9)     IV Piggyback 1000      Total Intake(mL/kg) 1707.1 (17.2) 1476.9 (14.9)     Urine (mL/kg/hr) 1650 2990 (1.3)     Stool 0 1     Total Output 1650 2991     Net +57.1 -1514.2            Stool Occurrence 1 x             Review of Systems   Objective:     Vital Signs (Most Recent):  Temp: 97.4 °F (36.3 °C) (10/29/18 2300)  Pulse: 72 (10/30/18 0600)  Resp: (!) 27 (10/30/18 0600)  BP: 132/76 (10/30/18 0600)  SpO2: 97 % (10/30/18 0630) Vital Signs (24h Range):  Temp:  [97.4 °F (36.3 °C)-97.9 °F (36.6 °C)] 97.4 °F (36.3 °C)  Pulse:  [51-72] 72  Resp:  [15-34] 27  SpO2:  [92 %-98 %] 97 %  BP: ()/(54-88) 132/76     Weight: 99.4 kg (219 lb 2.2 oz)  Height: 5' 2" (157.5 cm)  Body mass index is 40.08 kg/m².    Physical Exam   Constitutional: She appears well-developed. No distress.   HENT:   Head: Normocephalic and atraumatic.   Eyes: Conjunctivae and EOM are normal.   Neck: Normal range of motion. Neck supple.   Cardiovascular: Normal rate, regular rhythm, normal heart sounds and intact distal pulses.   Pulmonary/Chest: No respiratory distress. She has no wheezes.   Abdominal: Soft. Bowel " sounds are normal. She exhibits no distension. There is no tenderness.   Genitourinary:   Genitourinary Comments: Sun cath in place   Musculoskeletal: Normal range of motion. She exhibits no edema or tenderness.   Neurological: No cranial nerve deficit.   Skin: Skin is warm and dry. She is not diaphoretic. No erythema.   Psychiatric:   Mild anxiety   Nursing note and vitals reviewed.      Laboratory:  CBC:   Recent Labs   Lab 10/28/18  2300 10/29/18  0354 10/30/18  0422   WBC 9.13 9.38 10.13   RBC 4.16 4.19 4.17   HGB 12.4 12.6 12.3   HCT 36.2* 35.9* 35.3*   PLT 84* 91* 124*   MCV 87 86 85   MCH 29.8 30.1 29.5   MCHC 34.3 35.1 34.8     BMP:   Recent Labs   Lab 10/29/18  1935 10/29/18  2323 10/30/18  0422   * 289* 160*   * 135* 137   K 4.5 4.1 3.5    106 109   CO2 19* 17* 19*   BUN 95* 99* 99*   CREATININE 5.2* 5.2* 5.0*   CALCIUM 8.9 8.9 9.0     CMP:   Recent Labs   Lab 10/28/18  2300  10/29/18  1935 10/29/18  2323 10/30/18  0422   *   < > 249* 289* 160*   CALCIUM 8.2*   < > 8.9 8.9 9.0   ALBUMIN 2.3*  --   --   --   --    PROT 6.1  --   --   --   --    *   < > 134* 135* 137   K 4.5   < > 4.5 4.1 3.5   CO2 12*   < > 19* 17* 19*      < > 106 106 109   BUN 93*   < > 95* 99* 99*   CREATININE 5.8*   < > 5.2* 5.2* 5.0*   ALKPHOS 78  --   --   --   --    ALT 49*  --   --   --   --    AST 52*  --   --   --   --     < > = values in this interval not displayed.     ABGs:   Recent Labs   Lab 10/28/18  2317   PH 7.277*   PCO2 23.4*   HCO3 10.9*   POCSATURATED 95   BE -16         Assessment/Plan:     Long-term use of immunosuppressant medication    - Decrease dose to Tacrolimus 3/3  - Will need to continue to monitor daily levels of tacrolimus.   - Continue with prednisone 5 mg q D  - Hold on MMF on the setting of recent infection with UTI  - Will evaluate daily for signs and symptoms of immunosuppression toxicity        Diabetic ketoacidosis without coma associated with type 2 diabetes  mellitus    - Currently insulin ggt was hold and started on Insulin Aspart and Determir   - Managed per critical care        History of kidney transplant    - ESRD secondary to uncontrolled blood pressures after pregnancy, which was on HD for 5 years (BRADEN AVF which has been legated), DBD kidney transplant on 09/5/2013 on [facility]   - CKD stage III/IV with a serum creatinine baseline of 1.9-2.9  - Immunosuppression with tacrolimus 4 mg BID,   mg BID and prednisone 5 mg q D.     LISBET (acute kidney injury)    - LISBET superimposed on CKD which has been secondary to pre- renal vs iATN secondary do decreased perfusion due to sepsis and hypotension.   - Serum creatinine continues on downward trend from 5.2---> 5.1 today    - Acid/base: non anion gap metabolic acidosis which mostly secondary to DKA and LISBET has been improving 18---> 19. Will change sodium bicarbonate 1,300 mg q BID    - Avoid nephrotoxic medications   - Continue to monitor intake and output   - No need for RRT       Sepsis    - Mostly secondary to UTI which would continue with Rocephin   - UC with >100,000 colonies which mostly presumptive E. Coli           Tad Arriaga  Nephrology  Fellow  [facility]    Pager [phone]

## 2018-10-30 NOTE — ASSESSMENT & PLAN NOTE
Continue Rocephin IV (Day 2) for diagnosis of urosepsis at OSH (received Vancomycin and Levaquin x 3 days there)  Day 5 of antibiotics (total)  Patient asymptomatic and afebrile overnight

## 2018-10-30 NOTE — PROGRESS NOTES
[facility]  Critical Care Medicine  Progress Note    Patient Name: Bhumi Siegel  MRN: [id number]  Admission Date: 10/28/2018  Hospital Length of Stay: 2 days  Code Status: Full Code  Attending Provider: Piyush Linares MD  Primary Care Provider: Primary Doctor No   Principal Problem: Diabetic ketoacidosis; acute kidney injury    Subjective:     HPI:  48 year old  woman with hypertension, insulin dependent diabetes, history of right kidney cadaver transplant on Cellcept and Prograf, CKD stage 3, history of AV fistula status post reversal, and history of emergent caesarian delivery in 1990s transferred from [facility] for further management of an acute kidney injury with increase in creatinine from 1.7 (baseline) to 6 in the setting of admission and treatment for a presumed UTI. The patient was admitted on 10/26 and treated with Vancomycin and Levaquin at their facility. Her symptoms began on 10/24 and involved general malaise and nausea progressing to fevers and chills with a temperature of 103 at home. Patient received a CT scan at [facility] that made reference to mild perinephric fat stranding in the transplanted right kidney and severe atrophy in the left kidney with no signs of obstruction. Patient had an initial WBC count of 18.8 on admission (10/26) that improved to 14 today (10/28) prior to transfer. CBC at their facility also notable for mild thrombocytopenia < 90 for which the patient lacks any bleeding symptoms. Initial urinalysis was negative for nitrites and leukocyte esterase with 3+ protein and 15-20 RBC. Repeat urinalysis today (10/28) showed negative nitrites and negative leukocyte esterase. The patient currently lacks fevers/chills, nausea/vomiting, dysuria, and back pain. She arrived with a alvarado catheter with minimal urine output. Patient also has a history of anxiety for which she takes Lexapro daily.    Hospital/ICU Course:  10/28/18: Admitted  to MICU.  10/29/18: Started on insulin drip for DKA and consulted to renal transplant service for further management.  10/30/18: Stopped insulin drip, started home insulin regimen PLUS regular insulin with meals, and started renal/diabetic diet with PT/OT consult.    Interval History/Significant Events:   No events overnight.  Patient sitting upright in chair this morning.  Patient reporting that she feels much better and that her shortness of breath on presentation is nearly resolved.    Review of Systems     Objective:     Vital Signs (Most Recent):  Temp: 97.4 °F (36.3 °C) (10/29/18 2300)  Pulse: 72 (10/30/18 0600)  Resp: (!) 27 (10/30/18 0600)  BP: 132/76 (10/30/18 0600)  SpO2: 97 % (10/30/18 0630) Vital Signs (24h Range):  Temp:  [97.4 °F (36.3 °C)-97.9 °F (36.6 °C)] 97.4 °F (36.3 °C)  Pulse:  [51-72] 72  Resp:  [15-34] 27  SpO2:  [92 %-98 %] 97 %  BP: ()/(54-88) 132/76   Weight: 99.4 kg (219 lb 2.2 oz)  Body mass index is 40.08 kg/m².      Intake/Output Summary (Last 24 hours) at 10/30/2018 0725  Last data filed at 10/30/2018 0619  Gross per 24 hour   Intake 1326.85 ml   Output 2791 ml   Net -1464.15 ml     Physical Exam      Vents:     Lines/Drains/Airways     Central Venous Catheter Line                 Percutaneous Central Line Insertion/Assessment - triple lumen  10/29/18 0244 left internal jugular 1 day          Drain                 Hemodialysis AV Fistula Left upper arm -- days         Urethral Catheter 10/28/18 2146 16 Fr. 1 day              Significant Labs:    CBC/Anemia Profile:  Recent Labs   Lab 10/28/18  2300 10/29/18  0354 10/30/18  0422   WBC 9.13 9.38 10.13   HGB 12.4 12.6 12.3   HCT 36.2* 35.9* 35.3*   PLT 84* 91* 124*   MCV 87 86 85   RDW 12.2 12.2 12.7        Chemistries:  Recent Labs   Lab 10/28/18  2300  10/29/18  1935 10/29/18  2323 10/30/18  0422   *   < > 134* 135* 137   K 4.5   < > 4.5 4.1 3.5      < > 106 106 109   CO2 12*   < > 19* 17* 19*   BUN 93*   < > 95* 99*  99*   CREATININE 5.8*   < > 5.2* 5.2* 5.0*   CALCIUM 8.2*   < > 8.9 8.9 9.0   ALBUMIN 2.3*  --   --   --   --    PROT 6.1  --   --   --   --    BILITOT 0.4  --   --   --   --    ALKPHOS 78  --   --   --   --    ALT 49*  --   --   --   --    AST 52*  --   --   --   --    MG  --    < > 2.5 2.2 2.2   PHOS 4.2   < > 3.4 3.5 3.4    < > = values in this interval not displayed.     Significant Imaging:  No new imaging    Assessment/Plan:     Renal/   History of kidney transplant    Renal transplant recommends no dialysis at this time  Renal transplant recommends tacrolimus 4 mg BID,   mg BID and prednisone 5 mg daily (all started)  Baseline creatinine 1.5     LISBET (acute kidney injury)    Improving; creatinine at 5 from 6 on admission  Baseline Cr 1.5 with CKD Stage 3  Diuresis approximately 125 cc/hr x 24 hours (3 liters)  Renal transplant recommends no acute interventions  BMPs changed to q12h     ID   Sepsis    Continue Rocephin IV for diagnosis of urosepsis at OSH  Antibiotics for total of 7-10 days for complicated UTI  Currently Day 5 of IV Antibiotics (2 days Rocephin here and 3 days Levaquin at OSH)  Patient asymptomatic and afebrile overnight     Immunology/Multi System   Immunosuppression    Treat for complicated UTI given immunosuppression status     Endocrine   Diabetic ketoacidosis without coma associated with type 2 diabetes mellitus    Likely induced by complicated UTI (OSH diagnosis)  Anion gap has narrowed to 12  Last blood glucose 160  Bicarb improved to 19  Insulin infusion stopped  Home regimen 30 units long-acting ordered with additional 5 units Aspart TID with meals and SSI  Diabetic/renal diet started  Continue q2h Accuchecks until blood glucose stable  BMP changed to q12h        Critical Care Daily Checklist:    A: Awake: RASS Goal/Actual Goal:    Actual: Prado Agitation Sedation Scale (RASS): Alert and calm   B: Spontaneous Breathing Trial Performed?     C: SAT & SBT Coordinated?  NA                    D: Delirium: CAM-ICU Overall CAM-ICU: Negative   E: Early Mobility Performed? Yes   F: Feeding Goal:    Status:     Current Diet Order   Procedures    Diet renal      AS: Analgesia/Sedation NA   T: Thromboembolic Prophylaxis Heparin   H: HOB > 300 Yes   U: Stress Ulcer Prophylaxis (if needed) Not needed   G: Glucose Control Home regimen   B: Bowel Function Stool Occurrence: 1   I: Indwelling Catheter (Lines & Sun) Necessity Sun   D: De-escalation of Antimicrobials/Pharmacotherapies Rocephin IV Day 2    Plan for the day/ETD Monitor glucose off infusion and transfer to floor    Code Status:  Family/Goals of Care: Full Code       Critical secondary to Patient has a condition that poses threat to life and bodily function:   Diabetic ketoacidosis, acute kidney injury, urinary tract infection     Critical care was time spent personally by me on the following activities: development of treatment plan with patient or surrogate and bedside caregivers, discussions with consultants, evaluation of patient's response to treatment, examination of patient, ordering and performing treatments and interventions, ordering and review of laboratory studies, ordering and review of radiographic studies, pulse oximetry, re-evaluation of patient's condition. This critical care time did not overlap with that of any other provider or involve time for any procedures.     Dario Black MD  Critical Care Medicine  [facility]

## 2018-10-30 NOTE — PLAN OF CARE
Payor: MEDICAID / Plan: [facility] COMMUNITY PLAN [facility] ([place] MEDICAID) / Product Type: Managed Medicaid /     No future appointments.    Extended Emergency Contact Information  Primary Emergency Contact: Yamileth Rutherford  Mobile Phone: [phone]  Relation: Other       10/30/18 1506   Discharge Assessment   Assessment Type Discharge Planning Assessment   Confirmed/corrected address and phone number on facesheet? Yes   Assessment information obtained from? Patient;Medical Record   Expected Length of Stay (days) 4   Communicated expected length of stay with patient/caregiver no   Prior to hospitilization cognitive status: Alert/Oriented   Prior to hospitalization functional status: Independent   Current cognitive status: Alert/Oriented   Current Functional Status: Needs Assistance   Facility Arrived From: [facility]   Lives With alone   Able to Return to Prior Arrangements yes   Is patient able to care for self after discharge? Yes   Who are your caregiver(s) and their phone number(s)? Yamileth Rutherford [phone]     Patient's perception of discharge disposition home or selfcare   Readmission Within The Last 30 Days no previous admission in last 30 days   Patient currently being followed by outpatient case management? No   Patient currently receives any other outside agency services? No   Equipment Currently Used at Home none   Do you have any problems affording any of your prescribed medications? No   Is the patient taking medications as prescribed? yes   Does the patient have transportation home? Yes   Transportation Available family or friend will provide;car   Does the patient receive services at the [facility]madin Clinic? No   Discharge Plan A Home with family   Discharge Plan B Home   Bel Atkinson RN, BSN, CCM  Case Management  [facility]  Ext. [phone]

## 2018-10-30 NOTE — ASSESSMENT & PLAN NOTE
- Decrease dose to Tacrolimus 3/3  - Will need to continue to monitor daily levels of tacrolimus.   - Continue with prednisone 5 mg q D  - Hold on MMF on the setting of recent infection with UTI  - Will evaluate daily for signs and symptoms of immunosuppression toxicity

## 2018-10-30 NOTE — ASSESSMENT & PLAN NOTE
Likely induced by UTI (OSH diagnosis)  Anion gap has narrowed to 12  Last blood glucose 160  Bicarb improved to 19  Insulin infusion stopped  Home regimen 45 units long-acting ordered with additional 5 units Humulin TID with meals  Diabetic/renal diet started  Continue q2h Accuchecks  BMP changed to q12h

## 2018-10-31 PROBLEM — D64.9 NORMOCYTIC ANEMIA: Status: ACTIVE | Noted: 2018-10-31

## 2018-10-31 PROBLEM — N39.0 UTI (URINARY TRACT INFECTION): Status: ACTIVE | Noted: 2018-10-31

## 2018-10-31 PROBLEM — D69.6 THROMBOCYTOPENIA: Status: ACTIVE | Noted: 2018-10-31

## 2018-10-31 PROBLEM — E87.1 HYPONATREMIA: Status: ACTIVE | Noted: 2018-10-31

## 2018-10-31 PROBLEM — R73.9 HYPERGLYCEMIA: Status: ACTIVE | Noted: 2018-10-31

## 2018-10-31 LAB
ALBUMIN SERPL BCP-MCNC: 2.3 G/DL
ALP SERPL-CCNC: 84 U/L
ALT SERPL W/O P-5'-P-CCNC: 41 U/L
ANION GAP SERPL CALC-SCNC: 9 MMOL/L
ANION GAP SERPL CALC-SCNC: 9 MMOL/L
AST SERPL-CCNC: 40 U/L
BACTERIA UR CULT: NORMAL
BASOPHILS # BLD AUTO: 0.01 K/UL
BASOPHILS NFR BLD: 0.1 %
BILIRUB SERPL-MCNC: 0.5 MG/DL
BUN SERPL-MCNC: 78 MG/DL
BUN SERPL-MCNC: 82 MG/DL
CALCIUM SERPL-MCNC: 8.9 MG/DL
CALCIUM SERPL-MCNC: 9.1 MG/DL
CHLORIDE SERPL-SCNC: 103 MMOL/L
CHLORIDE SERPL-SCNC: 104 MMOL/L
CO2 SERPL-SCNC: 19 MMOL/L
CO2 SERPL-SCNC: 20 MMOL/L
CREAT SERPL-MCNC: 4.2 MG/DL
CREAT SERPL-MCNC: 4.3 MG/DL
DIFFERENTIAL METHOD: ABNORMAL
EOSINOPHIL # BLD AUTO: 0 K/UL
EOSINOPHIL NFR BLD: 0.3 %
ERYTHROCYTE [DISTWIDTH] IN BLOOD BY AUTOMATED COUNT: 12.7 %
EST. GFR  (AFRICAN AMERICAN): 13.1 ML/MIN/1.73 M^2
EST. GFR  (AFRICAN AMERICAN): 13.5 ML/MIN/1.73 M^2
EST. GFR  (NON AFRICAN AMERICAN): 11.4 ML/MIN/1.73 M^2
EST. GFR  (NON AFRICAN AMERICAN): 11.7 ML/MIN/1.73 M^2
GLUCOSE SERPL-MCNC: 212 MG/DL
GLUCOSE SERPL-MCNC: 215 MG/DL
HCT VFR BLD AUTO: 28.6 %
HGB BLD-MCNC: 10.2 G/DL
IMM GRANULOCYTES # BLD AUTO: 0.13 K/UL
IMM GRANULOCYTES NFR BLD AUTO: 1.8 %
LYMPHOCYTES # BLD AUTO: 1 K/UL
LYMPHOCYTES NFR BLD: 13.6 %
MAGNESIUM SERPL-MCNC: 1.7 MG/DL
MCH RBC QN AUTO: 30 PG
MCHC RBC AUTO-ENTMCNC: 35.7 G/DL
MCV RBC AUTO: 84 FL
MONOCYTES # BLD AUTO: 0.7 K/UL
MONOCYTES NFR BLD: 10.2 %
NEUTROPHILS # BLD AUTO: 5.2 K/UL
NEUTROPHILS NFR BLD: 74 %
NRBC BLD-RTO: 0 /100 WBC
PHOSPHATE SERPL-MCNC: 3.7 MG/DL
PLATELET # BLD AUTO: 105 K/UL
PMV BLD AUTO: 10.7 FL
POCT GLUCOSE: 189 MG/DL (ref 70–110)
POCT GLUCOSE: 196 MG/DL (ref 70–110)
POCT GLUCOSE: 203 MG/DL (ref 70–110)
POCT GLUCOSE: 206 MG/DL (ref 70–110)
POCT GLUCOSE: 209 MG/DL (ref 70–110)
POTASSIUM SERPL-SCNC: 4.3 MMOL/L
POTASSIUM SERPL-SCNC: 4.3 MMOL/L
POTASSIUM UR-SCNC: <10 MMOL/L
PROT SERPL-MCNC: 5.9 G/DL
RBC # BLD AUTO: 3.4 M/UL
SODIUM SERPL-SCNC: 132 MMOL/L
SODIUM SERPL-SCNC: 132 MMOL/L
SODIUM UR-SCNC: 86 MMOL/L
TACROLIMUS BLD-MCNC: 5 NG/ML
WBC # BLD AUTO: 7.04 K/UL

## 2018-10-31 PROCEDURE — 11000001 HC ACUTE MED/SURG PRIVATE ROOM

## 2018-10-31 PROCEDURE — 85025 COMPLETE CBC W/AUTO DIFF WBC: CPT

## 2018-10-31 PROCEDURE — 80048 BASIC METABOLIC PNL TOTAL CA: CPT

## 2018-10-31 PROCEDURE — 83735 ASSAY OF MAGNESIUM: CPT

## 2018-10-31 PROCEDURE — 63600175 PHARM REV CODE 636 W HCPCS: Performed by: STUDENT IN AN ORGANIZED HEALTH CARE EDUCATION/TRAINING PROGRAM

## 2018-10-31 PROCEDURE — 80053 COMPREHEN METABOLIC PANEL: CPT

## 2018-10-31 PROCEDURE — 63600175 PHARM REV CODE 636 W HCPCS: Performed by: INTERNAL MEDICINE

## 2018-10-31 PROCEDURE — 25000003 PHARM REV CODE 250: Performed by: STUDENT IN AN ORGANIZED HEALTH CARE EDUCATION/TRAINING PROGRAM

## 2018-10-31 PROCEDURE — 99233 SBSQ HOSP IP/OBS HIGH 50: CPT | Mod: ,,, | Performed by: INTERNAL MEDICINE

## 2018-10-31 PROCEDURE — 84100 ASSAY OF PHOSPHORUS: CPT

## 2018-10-31 PROCEDURE — 25000003 PHARM REV CODE 250: Performed by: INTERNAL MEDICINE

## 2018-10-31 PROCEDURE — 80197 ASSAY OF TACROLIMUS: CPT

## 2018-10-31 PROCEDURE — 97161 PT EVAL LOW COMPLEX 20 MIN: CPT

## 2018-10-31 RX ORDER — SODIUM BICARBONATE 650 MG/1
1300 TABLET ORAL 3 TIMES DAILY
Status: DISCONTINUED | OUTPATIENT
Start: 2018-10-31 | End: 2018-11-02 | Stop reason: HOSPADM

## 2018-10-31 RX ORDER — TACROLIMUS 1 MG/1
4 CAPSULE ORAL EVERY MORNING
Status: DISCONTINUED | OUTPATIENT
Start: 2018-11-01 | End: 2018-11-02 | Stop reason: HOSPADM

## 2018-10-31 RX ORDER — TACROLIMUS 1 MG/1
3 CAPSULE ORAL EVERY EVENING
Status: DISCONTINUED | OUTPATIENT
Start: 2018-10-31 | End: 2018-11-02 | Stop reason: HOSPADM

## 2018-10-31 RX ADMIN — PREDNISONE 5 MG: 2.5 TABLET ORAL at 08:10

## 2018-10-31 RX ADMIN — TACROLIMUS 3 MG: 1 CAPSULE ORAL at 08:10

## 2018-10-31 RX ADMIN — SODIUM BICARBONATE 650 MG TABLET 1300 MG: at 03:10

## 2018-10-31 RX ADMIN — INSULIN ASPART 2 UNITS: 100 INJECTION, SOLUTION INTRAVENOUS; SUBCUTANEOUS at 12:10

## 2018-10-31 RX ADMIN — TACROLIMUS 3 MG: 1 CAPSULE ORAL at 05:10

## 2018-10-31 RX ADMIN — INSULIN ASPART 6 UNITS: 100 INJECTION, SOLUTION INTRAVENOUS; SUBCUTANEOUS at 12:10

## 2018-10-31 RX ADMIN — SODIUM BICARBONATE 650 MG TABLET 1300 MG: at 09:10

## 2018-10-31 RX ADMIN — INSULIN ASPART 6 UNITS: 100 INJECTION, SOLUTION INTRAVENOUS; SUBCUTANEOUS at 04:10

## 2018-10-31 RX ADMIN — CEFTRIAXONE SODIUM 1 G: 1 INJECTION, POWDER, FOR SOLUTION INTRAMUSCULAR; INTRAVENOUS at 09:10

## 2018-10-31 RX ADMIN — HEPARIN SODIUM 5000 UNITS: 5000 INJECTION, SOLUTION INTRAVENOUS; SUBCUTANEOUS at 09:10

## 2018-10-31 RX ADMIN — INSULIN ASPART 2 UNITS: 100 INJECTION, SOLUTION INTRAVENOUS; SUBCUTANEOUS at 04:10

## 2018-10-31 RX ADMIN — SODIUM BICARBONATE 650 MG TABLET 1300 MG: at 08:10

## 2018-10-31 RX ADMIN — INSULIN ASPART 6 UNITS: 100 INJECTION, SOLUTION INTRAVENOUS; SUBCUTANEOUS at 08:10

## 2018-10-31 RX ADMIN — HEPARIN SODIUM 5000 UNITS: 5000 INJECTION, SOLUTION INTRAVENOUS; SUBCUTANEOUS at 08:10

## 2018-10-31 RX ADMIN — ESCITALOPRAM OXALATE 10 MG: 10 TABLET ORAL at 08:10

## 2018-10-31 NOTE — PROGRESS NOTES
Informed MD Hayden with [facility], pt with [facility], ok to send patient to floor with central line.

## 2018-10-31 NOTE — ASSESSMENT & PLAN NOTE
- Continue Tacrolimus 3/3  - Will need to continue to monitor daily levels of tacrolimus.   - Continue with prednisone 5 mg q D  - Hold on MMF on the setting of recent infection with UTI  - Will evaluate daily for signs and symptoms of immunosuppression toxicity

## 2018-10-31 NOTE — NURSING TRANSFER
Nursing Transfer Note      10/31/2018     Transfer To: 1126    Transfer via wheelchair    Transfer with cardiac monitoring    Transported by RN    Medicines sent: Yes    Chart send with patient: Yes    Notified: not notified. Pt refused, states will call in the morning    Patient reassessed at: 10-30-18 @ 2300    Upon arrival to floor: cardiac monitor applied, patient oriented to room, call bell in reach and bed in lowest position. Pt ambulated to bed. No acute distress noted. Receiving RN at bedside prior to leaving.

## 2018-10-31 NOTE — PT/OT/SLP EVAL
Physical Therapy Evaluation and Discharge Note    Patient Name:  Bhumi Siegel   MRN:  [id number]    Recommendations:     Discharge Recommendations:  home   Discharge Equipment Recommendations: none   Barriers to discharge: None    Assessment:     Bhumi Siegel is a 48 y.o. female admitted with a medical diagnosis of Diabetic ketoacidosis without coma associated with type 2 diabetes mellitus. .  At this time, patient is functioning at their prior level of function and does not require further acute PT services.     Recent Surgery: * No surgery found *      Plan:     During this hospitalization, patient does not require further acute PT services.  Please re-consult if situation changes.      Subjective     Chief Complaint: NA  Patient/Family Comments/goals: return home  Pain/Comfort:  · Pain Rating 1: 0/10  · Pain Rating Post-Intervention 1: 0/10    Patients cultural, spiritual, Zoroastrian conflicts given the current situation:      Living Environment:  Pt lives with sister in 1-story house with 3 ANTOINETTE with B handrails and tub/shower. Pt reports (I) with ADLs and amb.   Prior to admission, patients level of function was (I).  Equipment used at home: none.  DME owned (not currently used): none.  Upon discharge, patient will have assistance from family.    Objective:     Communicated with RN prior to session.  Patient found supine in bed upon PT entry to room found with: telemetry, central line     General Precautions: Standard, fall   Orthopedic Precautions:N/A   Braces: N/A     Exams:  · Cognitive Exam:  Patient is oriented to Person, Place, Time and Situation  · Gross Motor Coordination:  WFL  · Postural Exam:  Patient presented with the following abnormalities:    · -       No postural abnormalities identified  · Sensation:    · -       Intact  light/touch B LE  · Skin Integrity/Edema:      · -       Skin integrity: Visible skin intact  · RLE ROM: WFL  · RLE Strength: WFL  · LLE ROM: WFL  · LLE Strength:  WFL    Functional Mobility:  Bed Mobility:     · Supine to Sit: modified independence  · Sit to Supine: modified independence    Transfers:     · Sit to Stand:  supervision with no AD    Gait: ~200ft S without AD; no LOB or SOB    AM-PAC 6 CLICK MOBILITY  Total Score:19       Therapeutic Activities and Exercises:  Pt sat EOB with S.  Pt educated on:  -role of PT/POC  -safety with mobility  -importance of OOB activity  Pt reports no further questions or concerns from a mobility standpoint.  Pt safe to amb in hallway with RN staff.     AM-PAC 6 CLICK MOBILITY  Total Score:19     Patient left HOB elevated with all lines intact, call button in reach and RN notified.    GOALS:   Multidisciplinary Problems     Physical Therapy Goals     Not on file          Multidisciplinary Problems (Resolved)        Problem: Physical Therapy Goal    Goal Priority Disciplines Outcome Goal Variances Interventions   Physical Therapy Goal   (Resolved)     PT, PT/OT Outcome(s) achieved                     History:     Past Medical History:   Diagnosis Date    Hypertension     Kidney transplant recipient     Post-transplant diabetes mellitus        Past Surgical History:   Procedure Laterality Date    ANKLE FRACTURE SURGERY      AV FISTULA PLACEMENT       SECTION      KIDNEY TRANSPLANT         Clinical Decision Making:     Decision Making/ Complexity Score   On examination of body system using standardized tests and measures patient presents with 1-2 elements from any of the following: body structures and functions, activity limitations, and/or participation restrictions.  Leading to a clinical presentation that is considered stable and/or uncomplicated                              Clinical Decision Making  (Eval Complexity):  Low- 82535     Time Tracking:     PT Received On: 10/31/18  PT Start Time: 1010     PT Stop Time: 1018  PT Total Time (min): 8 min     Billable Minutes: Evaluation 8      BISHNU WHITE, PT  10/31/2018

## 2018-10-31 NOTE — PHYSICIAN QUERY
"PT Name: Bhumi Siegel  MR #: [id number]     Physician Query Form - Documentation Clarification      CDS: Joelle Licona RN, CCDS         Contact information :[phone]  [email or web address]       This form is a permanent document in the medical record.     Query Date: October 31, 2018    By submitting this query, we are merely seeking further clarification of documentation. Please utilize your independent clinical judgment when addressing the question(s) below.    The Medical record reflects the following:    Supporting Clinical Findings Location in Medical Record     Ht 5'2"  Wt 219 (bedscale)  BMI 40.2    "Weight: 99.4 kg (219 lb 2.2 oz)  Body mass index is 40.08 kg/m²."    "abdomen obese"     VS record 10/28/18        H&P  10/29/18      RN Plan of Care Note 10/29/18                                                                                Doctor, Please specify diagnosis or diagnoses associated with above clinical findings.    Provider Use Only        [ x ] Morbid Obesity    [  ] Other Obesity    [  ] Other condition , ______                                                                                                                  Clinically Undetermined               "

## 2018-10-31 NOTE — ASSESSMENT & PLAN NOTE
- ESRD secondary to uncontrolled blood pressures after pregnancy, which was on HD for 5 years (BRADEN AVF which has been legated), DBD kidney transplant on 09/5/2003 on [facility]   - CKD stage III/IV with a serum creatinine baseline of 1.9-2.9  - Immunosuppression with tacrolimus 4 mg BID,   mg BID and prednisone 5 mg q D.

## 2018-10-31 NOTE — PROGRESS NOTES
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 Patient was admitted on 10/24 at OSH for sepsis and LISBET, treated with vancomycin and Levaquin but she clinically worsened. Subsequently transferred here and admitted to ICU for severe sepsis, LISBET and DKA. Patient was started on ceftriaxone, and insulin gtt which clinical improvement. Kidney transplant team on board. Remained hemodynamically stable. Insulin drip transitioned to subcutaneous insulin. Downgraded from ICU to PCU on 10/30 to Hospitalist service.     SUBJECTIVE:     Pt was seen and examined at bedside. Pt had no acute events overnight, and no new complaints this morning. Pt remained hemodynamically stable and afebrile. Pt has been tolerating PO intake well without any nausea, vomiting, diarrhea, constipation, blood in stools or trouble urinating. She denied any bleeds. Pt denies any fevers, chills, malaise, headaches, chest pain, SOB, cough. Pt has been able to ambulate without any distress.    ROS (Positive in Bold, otherwise negative)  Pain Scale: 0 /10   Constitutional: fever, chills, night sweats  CV: chest pain, edema, palpitations  Resp: SOB, cough, sputum production  GI: changes in appetite, NVDC, pain, melena, hematochezia, GERD, hematemesis  : Dysuria, hematuria, urinary urgency, frequency  MSK:  arthralgia/myalgia, joint swelling  SKIN: rashes, pruritis, petechiae   Neuro/Psych, FNDm anxiety, depression      OBJECTIVE:     Vitals:  Temp:  [97.5 °F (36.4 °C)-99.1 °F (37.3 °C)]   Pulse:  [65-85]   Resp:  [16-39]   BP: (122-159)/(63-94)   SpO2:  [92 %-97 %]      I & O (Last 24H):     Intake/Output Summary (Last 24 hours) at 10/31/2018 1301  Last data filed at 10/31/2018 0400  Gross per 24 hour   Intake 240 ml   Output 2350 ml   Net -2110 ml         GEN: Obese  female in no acute distress. Nontoxic. Resting in bed. Cooperative.  HEENT: NCAT. PERRL. EOMI. Conjunctivae/corneas clear, sclera Anicteric.  CVS: RRR. Normal s1 s2 no murmur, click, rub or gallop  LUNG: CTAB. Normal respiratory effort. No wheezes, rhonchi, or crackles.  ABD: Normoactive BS, soft, NT, ND, no masses or organomegaly.  EXT: No edema. No cyanosis. Full ROM.  SKIN: color, texture, turgor normal. No rashes or lesions  NEURO: Alert, oriented x 4, Spont mvt of all extremities with no focal deficits noted.      All recent labs and imaging has been reviewed.     Recent Results (from the past 24 hour(s))   POCT glucose    Collection Time: 10/30/18  5:38 PM   Result Value Ref Range    POCT Glucose 243 (H) 70 - 110 mg/dL   POCT glucose    Collection Time: 10/30/18  8:27 PM   Result Value Ref Range    POCT Glucose 213 (H) 70 - 110 mg/dL   Basic metabolic panel    Collection Time: 10/30/18  8:29 PM   Result Value Ref Range    Sodium 134 (L) 136 - 145 mmol/L    Potassium 4.2 3.5 - 5.1 mmol/L    Chloride 107 95 - 110 mmol/L    CO2 18 (L) 23 - 29 mmol/L    Glucose 228 (H) 70 - 110 mg/dL    BUN, Bld 89 (H) 6 - 20 mg/dL    Creatinine 4.7 (H) 0.5 - 1.4 mg/dL    Calcium 8.9 8.7 - 10.5 mg/dL    Anion Gap 9 8 - 16 mmol/L    eGFR if African American 11.8 (A) >60 mL/min/1.73 m^2    eGFR if non African American 10.2 (A) >60 mL/min/1.73 m^2   Magnesium    Collection Time: 10/30/18  8:29 PM   Result Value Ref Range    Magnesium 2.0 1.6 - 2.6 mg/dL    Phosphorus    Collection Time: 10/30/18  8:29 PM   Result Value Ref Range    Phosphorus 3.1 2.7 - 4.5 mg/dL   POCT glucose    Collection Time: 10/30/18 11:43 PM   Result Value Ref Range    POCT Glucose 168 (H) 70 - 110 mg/dL   Tacrolimus level    Collection Time: 10/31/18  4:13 AM   Result Value Ref Range    Tacrolimus Lvl 5.0 5.0 - 15.0 ng/mL   CBC auto differential    Collection Time: 10/31/18  4:13 AM   Result Value Ref Range    WBC 7.04 3.90 - 12.70 K/uL    RBC 3.40 (L) 4.00 - 5.40 M/uL    Hemoglobin 10.2 (L) 12.0 - 16.0 g/dL    Hematocrit 28.6 (L) 37.0 - 48.5 %    MCV 84 82 - 98 fL    MCH 30.0 27.0 - 31.0 pg    MCHC 35.7 32.0 - 36.0 g/dL    RDW 12.7 11.5 - 14.5 %    Platelets 105 (L) 150 - 350 K/uL    MPV 10.7 9.2 - 12.9 fL    Immature Granulocytes 1.8 (H) 0.0 - 0.5 %    Gran # (ANC) 5.2 1.8 - 7.7 K/uL    Immature Grans (Abs) 0.13 (H) 0.00 - 0.04 K/uL    Lymph # 1.0 1.0 - 4.8 K/uL    Mono # 0.7 0.3 - 1.0 K/uL    Eos # 0.0 0.0 - 0.5 K/uL    Baso # 0.01 0.00 - 0.20 K/uL    nRBC 0 0 /100 WBC    Gran% 74.0 (H) 38.0 - 73.0 %    Lymph% 13.6 (L) 18.0 - 48.0 %    Mono% 10.2 4.0 - 15.0 %    Eosinophil% 0.3 0.0 - 8.0 %    Basophil% 0.1 0.0 - 1.9 %    Differential Method Automated    POCT glucose    Collection Time: 10/31/18  6:20 AM   Result Value Ref Range    POCT Glucose 203 (H) 70 - 110 mg/dL   Comprehensive metabolic panel    Collection Time: 10/31/18  7:33 AM   Result Value Ref Range    Sodium 132 (L) 136 - 145 mmol/L    Potassium 4.3 3.5 - 5.1 mmol/L    Chloride 104 95 - 110 mmol/L    CO2 19 (L) 23 - 29 mmol/L    Glucose 212 (H) 70 - 110 mg/dL    BUN, Bld 82 (H) 6 - 20 mg/dL    Creatinine 4.2 (H) 0.5 - 1.4 mg/dL    Calcium 8.9 8.7 - 10.5 mg/dL    Total Protein 5.9 (L) 6.0 - 8.4 g/dL    Albumin 2.3 (L) 3.5 - 5.2 g/dL    Total Bilirubin 0.5 0.1 - 1.0 mg/dL    Alkaline Phosphatase 84 55 - 135 U/L    AST 40 10 - 40 U/L    ALT 41 10 - 44 U/L    Anion Gap 9 8 - 16 mmol/L    eGFR if African American 13.5 (A) >60  mL/min/1.73 m^2    eGFR if non  11.7 (A) >60 mL/min/1.73 m^2   Basic metabolic panel    Collection Time: 10/31/18  7:37 AM   Result Value Ref Range    Sodium 132 (L) 136 - 145 mmol/L    Potassium 4.3 3.5 - 5.1 mmol/L    Chloride 103 95 - 110 mmol/L    CO2 20 (L) 23 - 29 mmol/L    Glucose 215 (H) 70 - 110 mg/dL    BUN, Bld 78 (H) 6 - 20 mg/dL    Creatinine 4.3 (H) 0.5 - 1.4 mg/dL    Calcium 9.1 8.7 - 10.5 mg/dL    Anion Gap 9 8 - 16 mmol/L    eGFR if African American 13.1 (A) >60 mL/min/1.73 m^2    eGFR if non African American 11.4 (A) >60 mL/min/1.73 m^2   Magnesium    Collection Time: 10/31/18  7:37 AM   Result Value Ref Range    Magnesium 1.7 1.6 - 2.6 mg/dL   Phosphorus    Collection Time: 10/31/18  7:37 AM   Result Value Ref Range    Phosphorus 3.7 2.7 - 4.5 mg/dL   POCT glucose    Collection Time: 10/31/18  8:05 AM   Result Value Ref Range    POCT Glucose 196 (H) 70 - 110 mg/dL   POCT glucose    Collection Time: 10/31/18 12:14 PM   Result Value Ref Range    POCT Glucose 209 (H) 70 - 110 mg/dL       Recent Labs   Lab 10/30/18  1738 10/30/18  2027 10/30/18  2343 10/31/18  0620 10/31/18  0805 10/31/18  1214   POCTGLUCOSE 243* 213* 168* 203* 196* 209*       Hemoglobin A1C   Date Value Ref Range Status   10/29/2018 9.5 (H) 4.0 - 5.6 % Final     Comment:     ADA Screening Guidelines:  5.7-6.4%  Consistent with prediabetes  >or=6.5%  Consistent with diabetes  High levels of fetal hemoglobin interfere with the HbA1C  assay. Heterozygous hemoglobin variants (HbS, HgC, etc)do  not significantly interfere with this assay.   However, presence of multiple variants may affect accuracy.          Active Hospital Problems    Diagnosis  POA    *Diabetic ketoacidosis without coma associated with type 2 diabetes mellitus [E11.10]  Yes    Hyponatremia [E87.1]  Yes    Thrombocytopenia [D69.6]  Yes    Normocytic anemia [D64.9]  Yes    Hyperglycemia [R73.9]  Yes    DKA (diabetic ketoacidoses) [E13.10]  Yes     Long-term use of immunosuppressant medication [Z79.899]  Not Applicable    Sepsis [A41.9]  Yes    LISBET (acute kidney injury) [N17.9]  Yes    History of kidney transplant [Z94.0]  Not Applicable    Immunosuppression [D89.9]  Yes      Resolved Hospital Problems    Diagnosis Date Resolved POA    Hypomagnesemia [E83.42] 10/30/2018 Yes    Dehydration [E86.0] 10/30/2018 Yes          ASSESSMENT AND PLAN:       Diabetic ketoacidosis likely secondary to UTI   Presented with fevers, hyperglycemia, ketonemia, AGMA  Clinically improved after insulin drip and antibiotics   Patient transitioned from insulin gtt now to subcutaneous insulin as DKA resolved.  Increased insulin detemir to 30 --> 35 this AM  Blood glucose reviewed. AGMA resolved. Continue to treat UTI.  Strict In/outs  DM diet    Sepsis secondary to UTI  UC with >100,000 colonies which mostly presumptive E. Coli   Continue ceftriaxone, plan to transition to PO tomorrow for total 10-14 days  Today is day 6 of IV abx (3 days of Levaquin at OSH and 3 days of rocephin here)    Acute Kidney Injury superimposed on CKD   History of kidney transplant on chronic immunosuppressants  Kidney transplant team on board  Creatine gradually improving, currently 4.3, baseline ~2.9  Continuing tacrolimus with daily lvls  Continue prednisone 5 mg daily  Holding MMF due to infection/sepsis, restart as per KTM    Non anion gap Metabolic Acidosis   Anion gap metabolic acidosis on admission resolved as DKA resolved  Non anion gap likely from kidney (vs gut), no urine electrolytes noted  Gradually improving on Sodium Bicarb as per KTM    Normocytic Anemia  Drop on Hgb noted 12.3 to 10.2  Hemodynamically stable   Patient has no external signs of bleeding  Will work up and continue to monitor     Hyponatremia  Noted. Will continue to monitor.     Thrombocytopenia  Noted. No signs of bleeding noted although hgb decreased  Suspecting reactive.     Anxiety   Continue escitalopram 10 mg  daily        Prophylaxis- Hep BID    Code Status- Full Code     Discharge plan and follow up - d/c home once medically stable    Yamil Thompson MD  Hospital Medicine Staff  Pager [phone]

## 2018-10-31 NOTE — PLAN OF CARE
Problem: Patient Care Overview  Goal: Plan of Care Review  Outcome: Ongoing (interventions implemented as appropriate)  Pt AAO x4; able to express needs.  Denies pain or discomfort.  SR on tele. ACCu checks q4h; 200s-280s; insulin admin as needed. Triple lumen PICC to Right jugular; labs collected and sent to lab.  Dialysis shunt to left arm, not in use.  amb independently throughout room.  Call light within reach, bed in lowest position. Will monitor.

## 2018-10-31 NOTE — PLAN OF CARE
Problem: Physical Therapy Goal  Goal: Physical Therapy Goal  Outcome: Outcome(s) achieved Date Met: 10/31/18  Pt evaluation complete. Pt safe to d/c home from a mobility standpoint without needs for skilled PT at this time.    BISHNU WHITE, PT  10/31/2018

## 2018-10-31 NOTE — PHYSICIAN QUERY
"PT Name: Bhumi Siegel  MR #: [id number]     Physician Query Form - Diagnosis Clarification      CDS: Joelle Licona RN, CCDS         Contact information :ext [phone]  [email or web address]       This form is a permanent document in the medical record.     Query Date: October 31, 2018    By submitting this query, we are merely seeking further clarification of documentation.  Please utilize your independent clinical judgment when addressing the question(s) below.     The medical record contains the following:      Findings Supporting Clinical Information Location in Medical Record     "sepsis   Unlikely  Presumed urinary source at outside hospital  Patient with 1/4 SIRS criteria at this time (RR high 20s) with repeat CBC pending  Patient treated with Vancomycin and Levaquin at OSH'                                  "Patient had an initial WBC count of 18.8 on admission (10/26) that improved to 14 today (10/28) prior to transfer. CBC at their facility also notable for mild thrombocytopenia < 90 for which the patient lacks any bleeding symptoms. Initial urinalysis was negative for nitrites and leukocyte esterase with 3+ protein and 15-20 RBC. Repeat urinalysis today (10/28) showed negative nitrites and negative leukocyte esterase. The patient currently lacks fevers/chills, nausea/vomiting, dysuria, and back pain. She arrived with a alvarado catheter with minimal urine output. Patient also has a history of anxiety for which she takes Lexapro daily.    "1) DKA. Start insulin infusion.  2) Mixed non-anion gap & anion gap metabolic acidosis.  3) LISBET on CKD in patient with renal transplant."    "Sepsis     Continue Rocephin IV for diagnosis of urosepsis at OSH  Antibiotics for total of 7-10 days for complicated UTI  Currently Day 5 of IV Antibiotics (2 days Rocephin here and 3 days Levaquin at OSH)  Patient asymptomatic and afebrile overnight"          H&P 10/28/18                  H&P " 10/28/18                                  H&P 10/28/18            Progress note 10/30/18         Please clarify if the Sepsis  diagnosis has been:    [  ] Ruled In   [  ] Ruled In, Now Resolved   [ x] Ruled Out   [  ] Other/Clarification of findings (please specify):_______      Clinically undetermined     Please document in your progress notes daily for the duration of treatment, until resolved, and include in your discharge summary.

## 2018-10-31 NOTE — ASSESSMENT & PLAN NOTE
- LISBET superimposed on CKD which has been secondary to pre- renal vs iATN secondary do decreased perfusion due to sepsis and hypotension.   - Serum creatinine continues on downward trend from 5.1---> 4.3 today    - Acid/base: non anion gap metabolic acidosis which mostly secondary to DKA and LISBET has been improving 19---> 20. Continue with sodium bicarbonate 1,300 mg q BID    - Avoid nephrotoxic medications   - Continue to monitor intake and output   - No need for RRT

## 2018-10-31 NOTE — PHYSICIAN QUERY
"PT Name: Bhumi Siegel  MR #: [id number]  Physician Query Form - Renal Condition Clarification     CDS: Joelle Licona RN, CCDS         Contact information :ext [phone])  [email or web address]       This form is a permanent document in the medical record.     QueryDate: October 31, 2018    By submitting this query, we are merely seeking further clarification of documentation. Please utilize your independent clinical judgment when addressing the question(s) below.    The Medical record contains the following:   Indicator Supporting Clinical Findings Location in Medical Record    Kidney (Renal) Insufficiency     x Kidney (Renal) Failure / Injury "- LISBET superimposed on CKD which has been secondary to pre- renal vs iATN secondary do decreased perfusion due to sepsis and hypotension.   - Serum creatinine has started to decreased from 5.8---> 5.3   - Acid/base: metabolic acidosis has been improving after sodium bicarbonate ggt, improved from 12---> 18  - Will require serial RFP   - Avoid nephrotoxic medications   - Continue to monitor intake and output   - No need for RRT" Assessment and Plan Note 10/30/18    Nephrotoxic Agents     x BUN/Creatinine GFR BUN 93, creatinine 5.8, GFR 7.9  BUN 89, creatinine 4.7, GFR 10.2 Lab 10/28/18  Lab 10/30/18    Urine: Casts         Eosinophils     x Dehydration   "Dehydration     Suspect due to DKA given hyperglycemia to 400 on arrival      H&P 10/29/18    Nausea/Vomiting      Dialysis/CRRT     x Treatment: 1 liter LR bolus ordered H&P 10/29/18   x Other:  "transferred from [facility] for further management of an acute kidney injury with increase in creatinine from 1.7 (baseline) to 6 in the setting of admission and treatment for a presumed UTI"  "She arrived with a alvarado catheter with minimal urine output" H&P 10/29/18   Acute Kidney Injury / Acute Renal Failure has different defining criteria. A generally accepted guideline  is:   A greater than 100% (2X) " rise in serum creatinine from baseline* occurring during the course of a single hospital stay.   *Baseline as determined by the providers judgment and consideration of previous lab values and other documentation, if available.  A diagnosis of Acute Kidney Injury/ Acute Renal Failure should incorporate abnormal labs and clinical findings that are clinically significant    References: 1. Kale et al. Acute renal failure-definition, outcome measures, animal models, fluid therapy and information technology needs: the Second International Consensus Conference of the Acute Dialysis Quality Initiative (ADQI) Group. Crit Care 2004; 8:B204; 2. Byron et al. Acute Kidney Injury Network: report of an initiative to improve outcomes in acute kidney injury. Crit Care 2007; 11:R31; 3. Kidney Disease: Improving Global Outcomes (KDIGO). Acute Kidney Injury Work Group. KDIGO clinical practice guidelines for acute kidney injury. Kidney Int Suppl 2012; 2:1.  The clinical guidelines noted below is only a system guideline, it does not replace the providers clinical judgment.      Doctor, please specify the diagnosis or diagnoses associated with above clinical findings.      [  x  ]  Acute Kidney Failure/Injury with Tubular Necrosis  Damage to the tubule cells of the kidney. Common triggers: shock, hypotension, IV contrast, rhabdomyolysis, medications  [    ]  Other Acute Kidney Failure/Injury (please specify): ____________    [    ]  Unspecified Acute Kidney Failure/Injury     [    ]  Other (please specify): _______________________________  [    ]  Clinically Undetermined    Please document in your progress notes daily for the duration of treatment until resolved and include in your discharge summary.

## 2018-10-31 NOTE — PROGRESS NOTES
[facility]  Kidney Transplant  Progress Note      Reason for Follow-up: Reassessment of  recipient and management of immunosuppression.        Subjective:   History of Present Illness:  Angelica Siegel is a 48 year old woman with past medical history of  ESRD secondary to uncontrolled blood pressures after pregnancy, which was on HD for 5 years (BRADEN AVF which has been legated), DBD kidney transplant on 09/5/2003 on [facility]  with CKD stage III with a serum creatinine baseline of 1.4-1.5, on immunosuppression with tacrolimus 4 mg BID,   mg BID and prednisone 5 mg q D. She arrived to [facility] as a transferred from [facility] for further management of an acute kidney injury which increased from baseline to 6 in the setting of admission and treatment for a presumed UTI. The patient was admitted on 10/26/18 and treated with Vancomycin and Levaquin at their facility. Her symptoms began on 10/24/18 which involved general malaise and nausea progressing to fevers and chills with a temperature of 103 at home. [place] that made reference to mild perinephric fat stranding in the transplanted right kidney and severe atrophy in the left kidney with no signs of obstruction. Has been presenting with uncontrolled blood glucose which is treated for DKA, with insuline and bicarbonate ggt. KTM was consulted for management of immunosuppression in the setting of LISBET, Sepsis secondary to UTI.     Ms. Siegel is a 48 y.o. year old female who is status post .    Her maintenance immunosuppression consists of:   Immunosuppressants (From admission, onward)    Start     Stop Route Frequency Ordered    10/30/18 1800  tacrolimus capsule 3 mg      -- Oral 2 times daily 10/30/18 1111          Interval History:  Patient evaluated at bedside, no significant event overnight. States that she is feeling better, no eating well this morning do  "to not having appetite for the food that was brought. Denies any fever, chills, nausea,dysuria, hematuria, vomiting or diarrhea    Past Medical, Surgical, Family, and Social History:   Unchanged from H&P.    Scheduled Meds:   cefTRIAXone (ROCEPHIN) IVPB  1 g Intravenous Q24H    escitalopram oxalate  10 mg Oral Daily    heparin (porcine)  5,000 Units Subcutaneous Q12H    insulin aspart U-100  6 Units Subcutaneous TIDWM    insulin detemir U-100  35 Units Subcutaneous Daily    predniSONE  5 mg Oral Daily    sodium bicarbonate  1,300 mg Oral BID    tacrolimus  3 mg Oral BID     Continuous Infusions:  PRN Meds:acetaminophen, dextrose 50%, glucagon (human recombinant), glucose, glucose, insulin aspart U-100, ondansetron, sodium chloride 0.9%    Intake/Output - Last 3 Shifts       10/29 0700 - 10/30 0659 10/30 0700 - 10/31 0659 10/31 0700 - 11/01 0659    P.O. 240 1100     I.V. (mL/kg) 1476.9 (14.9) 3.9 (0)     IV Piggyback       Total Intake(mL/kg) 1716.9 (17.3) 1103.9 (11.1)     Urine (mL/kg/hr) 3490 (1.5) 3040 (1.3)     Stool 1 0     Total Output 3491 3040     Net -1774.2 -1936.1            Stool Occurrence  1 x            Review of Systems   Objective:     Vital Signs (Most Recent):  Temp: 98.1 °F (36.7 °C) (10/31/18 0800)  Pulse: 77 (10/31/18 0800)  Resp: 17 (10/31/18 0800)  BP: (!) 146/94 (10/31/18 0800)  SpO2: 96 % (10/31/18 0800) Vital Signs (24h Range):  Temp:  [97.1 °F (36.2 °C)-99.1 °F (37.3 °C)] 98.1 °F (36.7 °C)  Pulse:  [65-85] 77  Resp:  [17-39] 17  SpO2:  [92 %-97 %] 96 %  BP: (120-159)/(63-94) 146/94     Weight: 99.4 kg (219 lb 2.2 oz)  Height: 5' 2" (157.5 cm)  Body mass index is 40.08 kg/m².    Physical Exam   Constitutional: She appears well-developed. No distress.   HENT:   Head: Normocephalic and atraumatic.   Eyes: Conjunctivae and EOM are normal.   Neck: Normal range of motion. Neck supple.   Cardiovascular: Normal rate, regular rhythm, normal heart sounds and intact distal pulses. "   Pulmonary/Chest: No respiratory distress. She has no wheezes.   Abdominal: Soft. Bowel sounds are normal. She exhibits no distension. There is no tenderness.   Musculoskeletal: Normal range of motion. She exhibits no edema or tenderness.   Neurological: No cranial nerve deficit.   Skin: Skin is warm and dry. She is not diaphoretic. No erythema.   Nursing note and vitals reviewed.      Laboratory:  CBC:   Recent Labs   Lab 10/29/18  0354 10/30/18  0422 10/31/18  0413   WBC 9.38 10.13 7.04   RBC 4.19 4.17 3.40*   HGB 12.6 12.3 10.2*   HCT 35.9* 35.3* 28.6*   PLT 91* 124* 105*   MCV 86 85 84   MCH 30.1 29.5 30.0   MCHC 35.1 34.8 35.7     BMP:   Recent Labs   Lab 10/30/18  2029 10/31/18  0733 10/31/18  0737   * 212* 215*   * 132* 132*   K 4.2 4.3 4.3    104 103   CO2 18* 19* 20*   BUN 89* 82* 78*   CREATININE 4.7* 4.2* 4.3*   CALCIUM 8.9 8.9 9.1     CMP:   Recent Labs   Lab 10/28/18  2300  10/30/18  2029 10/31/18  0733 10/31/18  0737   *   < > 228* 212* 215*   CALCIUM 8.2*   < > 8.9 8.9 9.1   ALBUMIN 2.3*  --   --  2.3*  --    PROT 6.1  --   --  5.9*  --    *   < > 134* 132* 132*   K 4.5   < > 4.2 4.3 4.3   CO2 12*   < > 18* 19* 20*      < > 107 104 103   BUN 93*   < > 89* 82* 78*   CREATININE 5.8*   < > 4.7* 4.2* 4.3*   ALKPHOS 78  --   --  84  --    ALT 49*  --   --  41  --    AST 52*  --   --  40  --     < > = values in this interval not displayed.     ABGs:   Recent Labs   Lab 10/28/18  2317   PH 7.277*   PCO2 23.4*   HCO3 10.9*   POCSATURATED 95   BE -16           Assessment/Plan:     * Diabetic ketoacidosis without coma associated with type 2 diabetes mellitus    - Currently insulin ggt was hold and started on Insulin Aspart and Determir   - Managed per critical care        Long-term use of immunosuppressant medication    - Continue Tacrolimus 3/3  - Will need to continue to monitor daily levels of tacrolimus.   - Continue with prednisone 5 mg q D  - Hold on MMF on the  setting of recent infection with UTI  - Will evaluate daily for signs and symptoms of immunosuppression toxicity        History of kidney transplant    - ESRD secondary to uncontrolled blood pressures after pregnancy, which was on HD for 5 years (BRADEN AVF which has been legated), DBD kidney transplant on 09/5/2003 on [facility]   - CKD stage III/IV with a serum creatinine baseline of 1.9-2.9  - Immunosuppression with tacrolimus 4 mg BID,   mg BID and prednisone 5 mg q D.     LISBET (acute kidney injury)    - LISBET superimposed on CKD which has been secondary to pre- renal vs iATN secondary do decreased perfusion due to sepsis and hypotension.   - Serum creatinine continues on downward trend from 5.1---> 4.3 today    - Acid/base: non anion gap metabolic acidosis which mostly secondary to DKA and LISBET has been improving 19---> 20. Continue with sodium bicarbonate 1,300 mg q BID    - Avoid nephrotoxic medications   - Continue to monitor intake and output   - No need for RRT       Sepsis    - Mostly secondary to UTI which would continue with Rocephin   - UC with >100,000 colonies which mostly presumptive E. Coli         aTd Arriaga  Nephrology  Fellow  [facility]    Pager [phone]

## 2018-10-31 NOTE — SUBJECTIVE & OBJECTIVE
Subjective:   History of Present Illness:  Angelica Siegel is a 48 year old woman with past medical history of  ESRD secondary to uncontrolled blood pressures after pregnancy, which was on HD for 5 years (BRADEN AVF which has been legated), DBD kidney transplant on 09/5/2003 on [facility]  with CKD stage III with a serum creatinine baseline of 1.4-1.5, on immunosuppression with tacrolimus 4 mg BID,   mg BID and prednisone 5 mg q D. She arrived to [facility] as a transferred from [facility] for further management of an acute kidney injury which increased from baseline to 6 in the setting of admission and treatment for a presumed UTI. The patient was admitted on 10/26/18 and treated with Vancomycin and Levaquin at their facility. Her symptoms began on 10/24/18 which involved general malaise and nausea progressing to fevers and chills with a temperature of 103 at home. Patient received a CT scan at [facility] that made reference to mild perinephric fat stranding in the transplanted right kidney and severe atrophy in the left kidney with no signs of obstruction. Has been presenting with uncontrolled blood glucose which is treated for DKA, with insuline and bicarbonate ggt. KTM was consulted for management of immunosuppression in the setting of LISBET, Sepsis secondary to UTI.     Ms. Siegel is a 48 y.o. year old female who is status post .    Her maintenance immunosuppression consists of:   Immunosuppressants (From admission, onward)    Start     Stop Route Frequency Ordered    10/30/18 1800  tacrolimus capsule 3 mg      -- Oral 2 times daily 10/30/18 1111          Interval History:  Patient evaluated at bedside, no significant event overnight. States that she is feeling better, no eating well this morning do to not having appetite for the food that was brought. Denies any fever, chills, nausea,dysuria, hematuria, vomiting or diarrhea    Past Medical, Surgical, Family,  "and Social History:   Unchanged from H&P.    Scheduled Meds:   cefTRIAXone (ROCEPHIN) IVPB  1 g Intravenous Q24H    escitalopram oxalate  10 mg Oral Daily    heparin (porcine)  5,000 Units Subcutaneous Q12H    insulin aspart U-100  6 Units Subcutaneous TIDWM    insulin detemir U-100  35 Units Subcutaneous Daily    predniSONE  5 mg Oral Daily    sodium bicarbonate  1,300 mg Oral BID    tacrolimus  3 mg Oral BID     Continuous Infusions:  PRN Meds:acetaminophen, dextrose 50%, glucagon (human recombinant), glucose, glucose, insulin aspart U-100, ondansetron, sodium chloride 0.9%    Intake/Output - Last 3 Shifts       10/29 0700 - 10/30 0659 10/30 0700 - 10/31 0659 10/31 0700 - 11/01 0659    P.O. 240 1100     I.V. (mL/kg) 1476.9 (14.9) 3.9 (0)     IV Piggyback       Total Intake(mL/kg) 1716.9 (17.3) 1103.9 (11.1)     Urine (mL/kg/hr) 3490 (1.5) 3040 (1.3)     Stool 1 0     Total Output 3491 3040     Net -1774.2 -1936.1            Stool Occurrence  1 x            Review of Systems   Objective:     Vital Signs (Most Recent):  Temp: 98.1 °F (36.7 °C) (10/31/18 0800)  Pulse: 77 (10/31/18 0800)  Resp: 17 (10/31/18 0800)  BP: (!) 146/94 (10/31/18 0800)  SpO2: 96 % (10/31/18 0800) Vital Signs (24h Range):  Temp:  [97.1 °F (36.2 °C)-99.1 °F (37.3 °C)] 98.1 °F (36.7 °C)  Pulse:  [65-85] 77  Resp:  [17-39] 17  SpO2:  [92 %-97 %] 96 %  BP: (120-159)/(63-94) 146/94     Weight: 99.4 kg (219 lb 2.2 oz)  Height: 5' 2" (157.5 cm)  Body mass index is 40.08 kg/m².    Physical Exam   Constitutional: She appears well-developed. No distress.   HENT:   Head: Normocephalic and atraumatic.   Eyes: Conjunctivae and EOM are normal.   Neck: Normal range of motion. Neck supple.   Cardiovascular: Normal rate, regular rhythm, normal heart sounds and intact distal pulses.   Pulmonary/Chest: No respiratory distress. She has no wheezes.   Abdominal: Soft. Bowel sounds are normal. She exhibits no distension. There is no tenderness. "   Musculoskeletal: Normal range of motion. She exhibits no edema or tenderness.   Neurological: No cranial nerve deficit.   Skin: Skin is warm and dry. She is not diaphoretic. No erythema.   Nursing note and vitals reviewed.      Laboratory:  CBC:   Recent Labs   Lab 10/29/18  0354 10/30/18  0422 10/31/18  0413   WBC 9.38 10.13 7.04   RBC 4.19 4.17 3.40*   HGB 12.6 12.3 10.2*   HCT 35.9* 35.3* 28.6*   PLT 91* 124* 105*   MCV 86 85 84   MCH 30.1 29.5 30.0   MCHC 35.1 34.8 35.7     BMP:   Recent Labs   Lab 10/30/18  2029 10/31/18  0733 10/31/18  0737   * 212* 215*   * 132* 132*   K 4.2 4.3 4.3    104 103   CO2 18* 19* 20*   BUN 89* 82* 78*   CREATININE 4.7* 4.2* 4.3*   CALCIUM 8.9 8.9 9.1     CMP:   Recent Labs   Lab 10/28/18  2300  10/30/18  2029 10/31/18  0733 10/31/18  0737   *   < > 228* 212* 215*   CALCIUM 8.2*   < > 8.9 8.9 9.1   ALBUMIN 2.3*  --   --  2.3*  --    PROT 6.1  --   --  5.9*  --    *   < > 134* 132* 132*   K 4.5   < > 4.2 4.3 4.3   CO2 12*   < > 18* 19* 20*      < > 107 104 103   BUN 93*   < > 89* 82* 78*   CREATININE 5.8*   < > 4.7* 4.2* 4.3*   ALKPHOS 78  --   --  84  --    ALT 49*  --   --  41  --    AST 52*  --   --  40  --     < > = values in this interval not displayed.     ABGs:   Recent Labs   Lab 10/28/18  2317   PH 7.277*   PCO2 23.4*   HCO3 10.9*   POCSATURATED 95   BE -16

## 2018-11-01 PROBLEM — N12 PYELONEPHRITIS: Status: ACTIVE | Noted: 2018-11-01

## 2018-11-01 LAB
ANION GAP SERPL CALC-SCNC: 8 MMOL/L
BASOPHILS # BLD AUTO: 0.04 K/UL
BASOPHILS NFR BLD: 0.6 %
BUN SERPL-MCNC: 71 MG/DL
CALCIUM SERPL-MCNC: 9 MG/DL
CHLORIDE SERPL-SCNC: 103 MMOL/L
CO2 SERPL-SCNC: 23 MMOL/L
CREAT SERPL-MCNC: 3.6 MG/DL
DIFFERENTIAL METHOD: ABNORMAL
EOSINOPHIL # BLD AUTO: 0.1 K/UL
EOSINOPHIL NFR BLD: 1.4 %
ERYTHROCYTE [DISTWIDTH] IN BLOOD BY AUTOMATED COUNT: 12.7 %
EST. GFR  (AFRICAN AMERICAN): 16.2 ML/MIN/1.73 M^2
EST. GFR  (NON AFRICAN AMERICAN): 14.1 ML/MIN/1.73 M^2
GLUCOSE SERPL-MCNC: 143 MG/DL
HCT VFR BLD AUTO: 34.1 %
HGB BLD-MCNC: 11.8 G/DL
IMM GRANULOCYTES # BLD AUTO: 0.33 K/UL
IMM GRANULOCYTES NFR BLD AUTO: 4.7 %
LYMPHOCYTES # BLD AUTO: 1.3 K/UL
LYMPHOCYTES NFR BLD: 19.2 %
MAGNESIUM SERPL-MCNC: 1.4 MG/DL
MCH RBC QN AUTO: 29.9 PG
MCHC RBC AUTO-ENTMCNC: 34.6 G/DL
MCV RBC AUTO: 87 FL
MONOCYTES # BLD AUTO: 0.7 K/UL
MONOCYTES NFR BLD: 10 %
NEUTROPHILS # BLD AUTO: 4.5 K/UL
NEUTROPHILS NFR BLD: 64.1 %
NRBC BLD-RTO: 0 /100 WBC
PHOSPHATE SERPL-MCNC: 3.7 MG/DL
PLATELET # BLD AUTO: 120 K/UL
PMV BLD AUTO: 11.3 FL
POCT GLUCOSE: 143 MG/DL (ref 70–110)
POCT GLUCOSE: 150 MG/DL (ref 70–110)
POCT GLUCOSE: 181 MG/DL (ref 70–110)
POCT GLUCOSE: 233 MG/DL (ref 70–110)
POCT GLUCOSE: 235 MG/DL (ref 70–110)
POTASSIUM SERPL-SCNC: 3.9 MMOL/L
RBC # BLD AUTO: 3.94 M/UL
SODIUM SERPL-SCNC: 134 MMOL/L
TACROLIMUS BLD-MCNC: 5.3 NG/ML
WBC # BLD AUTO: 6.99 K/UL

## 2018-11-01 PROCEDURE — 99233 SBSQ HOSP IP/OBS HIGH 50: CPT | Mod: ,,, | Performed by: INTERNAL MEDICINE

## 2018-11-01 PROCEDURE — 25000003 PHARM REV CODE 250: Performed by: INTERNAL MEDICINE

## 2018-11-01 PROCEDURE — 63600175 PHARM REV CODE 636 W HCPCS: Performed by: INTERNAL MEDICINE

## 2018-11-01 PROCEDURE — 80048 BASIC METABOLIC PNL TOTAL CA: CPT

## 2018-11-01 PROCEDURE — 11000001 HC ACUTE MED/SURG PRIVATE ROOM

## 2018-11-01 PROCEDURE — 84100 ASSAY OF PHOSPHORUS: CPT

## 2018-11-01 PROCEDURE — 87040 BLOOD CULTURE FOR BACTERIA: CPT

## 2018-11-01 PROCEDURE — 83735 ASSAY OF MAGNESIUM: CPT

## 2018-11-01 PROCEDURE — 25000003 PHARM REV CODE 250: Performed by: STUDENT IN AN ORGANIZED HEALTH CARE EDUCATION/TRAINING PROGRAM

## 2018-11-01 PROCEDURE — 63600175 PHARM REV CODE 636 W HCPCS: Performed by: STUDENT IN AN ORGANIZED HEALTH CARE EDUCATION/TRAINING PROGRAM

## 2018-11-01 PROCEDURE — 85025 COMPLETE CBC W/AUTO DIFF WBC: CPT

## 2018-11-01 PROCEDURE — 80197 ASSAY OF TACROLIMUS: CPT

## 2018-11-01 PROCEDURE — 36415 COLL VENOUS BLD VENIPUNCTURE: CPT

## 2018-11-01 RX ADMIN — INSULIN ASPART 6 UNITS: 100 INJECTION, SOLUTION INTRAVENOUS; SUBCUTANEOUS at 08:11

## 2018-11-01 RX ADMIN — HEPARIN SODIUM 5000 UNITS: 5000 INJECTION, SOLUTION INTRAVENOUS; SUBCUTANEOUS at 08:11

## 2018-11-01 RX ADMIN — TACROLIMUS 3 MG: 1 CAPSULE ORAL at 05:11

## 2018-11-01 RX ADMIN — INSULIN ASPART 1 UNITS: 100 INJECTION, SOLUTION INTRAVENOUS; SUBCUTANEOUS at 08:11

## 2018-11-01 RX ADMIN — INSULIN ASPART 2 UNITS: 100 INJECTION, SOLUTION INTRAVENOUS; SUBCUTANEOUS at 04:11

## 2018-11-01 RX ADMIN — INSULIN ASPART 6 UNITS: 100 INJECTION, SOLUTION INTRAVENOUS; SUBCUTANEOUS at 11:11

## 2018-11-01 RX ADMIN — INSULIN ASPART 6 UNITS: 100 INJECTION, SOLUTION INTRAVENOUS; SUBCUTANEOUS at 04:11

## 2018-11-01 RX ADMIN — MAGNESIUM SULFATE HEPTAHYDRATE 3 G: 500 INJECTION, SOLUTION INTRAMUSCULAR; INTRAVENOUS at 08:11

## 2018-11-01 RX ADMIN — TACROLIMUS 4 MG: 1 CAPSULE ORAL at 08:11

## 2018-11-01 RX ADMIN — ESCITALOPRAM OXALATE 10 MG: 10 TABLET ORAL at 08:11

## 2018-11-01 RX ADMIN — SODIUM BICARBONATE 650 MG TABLET 1300 MG: at 08:11

## 2018-11-01 RX ADMIN — PREDNISONE 5 MG: 2.5 TABLET ORAL at 08:11

## 2018-11-01 RX ADMIN — ERTAPENEM SODIUM 1 G: 1 INJECTION, POWDER, LYOPHILIZED, FOR SOLUTION INTRAMUSCULAR; INTRAVENOUS at 11:11

## 2018-11-01 RX ADMIN — SODIUM BICARBONATE 650 MG TABLET 1300 MG: at 02:11

## 2018-11-01 NOTE — PROGRESS NOTES
[facility]  Kidney Transplant  Progress Note      Reason for Follow-up: Reassessment of  recipient and management of immunosuppression.        Subjective:   History of Present Illness:  Angelica Siegel is a 48 year old woman with past medical history of  ESRD secondary to uncontrolled blood pressures after pregnancy, which was on HD for 5 years (BRADEN AVF which has been legated), DBD kidney transplant on 09/5/2003 on [facility]  with CKD stage III with a serum creatinine baseline of 1.4-1.5, on immunosuppression with tacrolimus 4 mg BID,   mg BID and prednisone 5 mg q D. She arrived to [facility] as a transferred from [facility] for further management of an acute kidney injury which increased from baseline to 6 in the setting of admission and treatment for a presumed UTI. The patient was admitted on 10/26/18 and treated with Vancomycin and Levaquin at their facility. Her symptoms began on 10/24/18 which involved general malaise and nausea progressing to fevers and chills with a temperature of 103 at home. Patient received a CT scan at [facility] that made reference to mild perinephric fat stranding in the transplanted right kidney and severe atrophy in the left kidney with no signs of obstruction. Has been presenting with uncontrolled blood glucose which is treated for DKA, with insuline and bicarbonate ggt. KTM was consulted for management of immunosuppression in the setting of LISBET, Sepsis secondary to UTI.     Ms. Siegel is a 48 y.o. year old female who is status post .    Her maintenance immunosuppression consists of:   Immunosuppressants (From admission, onward)    Start     Stop Route Frequency Ordered    11/01/18 0800  tacrolimus capsule 4 mg      -- Oral Daily 10/31/18 1348    10/31/18 1800  tacrolimus capsule 3 mg      -- Oral Daily 10/31/18 1348          Interval History:  Patient evaluated at bedside, no significant event overnight.  "Denies any fever, nausea, vomiting or diarrhea.     Past Medical, Surgical, Family, and Social History:   Unchanged from H&P.    Scheduled Meds:   cefTRIAXone (ROCEPHIN) IVPB  1 g Intravenous Q24H    escitalopram oxalate  10 mg Oral Daily    heparin (porcine)  5,000 Units Subcutaneous Q12H    insulin aspart U-100  6 Units Subcutaneous TIDWM    insulin detemir U-100  35 Units Subcutaneous Daily    magnesium sulfate IVPB  3 g Intravenous Once    predniSONE  5 mg Oral Daily    sodium bicarbonate  1,300 mg Oral TID    tacrolimus  3 mg Oral Daily    tacrolimus  4 mg Oral Daily     Continuous Infusions:  PRN Meds:acetaminophen, dextrose 50%, glucagon (human recombinant), glucose, glucose, insulin aspart U-100, ondansetron, sodium chloride 0.9%    Intake/Output - Last 3 Shifts       10/30 0700 - 10/31 0659 10/31 0700 - 11/01 0659 11/01 0700 - 11/02 0659    P.O. 1100 1060     I.V. (mL/kg) 3.9 (0)      Total Intake(mL/kg) 1103.9 (11.1) 1060 (10.7)     Urine (mL/kg/hr) 3040 (1.3) 2740 (1.1)     Stool 0      Total Output 3040 2740     Net -1936.1 -1680            Urine Occurrence  4 x     Stool Occurrence 1 x             Review of Systems   Objective:     Vital Signs (Most Recent):  Temp: 98.7 °F (37.1 °C) (11/01/18 0755)  Pulse: 80 (11/01/18 0755)  Resp: (!) 22 (11/01/18 0755)  BP: 116/72 (11/01/18 0755)  SpO2: (!) 94 % (11/01/18 0755) Vital Signs (24h Range):  Temp:  [97.5 °F (36.4 °C)-98.7 °F (37.1 °C)] 98.7 °F (37.1 °C)  Pulse:  [66-80] 80  Resp:  [12-22] 22  SpO2:  [93 %-98 %] 94 %  BP: (116-148)/(72-94) 116/72     Weight: 99.4 kg (219 lb 2.2 oz)  Height: 5' 2" (157.5 cm)  Body mass index is 40.08 kg/m².    Physical Exam   Constitutional: She appears well-developed. No distress.   HENT:   Head: Normocephalic and atraumatic.   Eyes: Conjunctivae and EOM are normal.   Neck: Normal range of motion. Neck supple.   Cardiovascular: Normal rate, regular rhythm, normal heart sounds and intact distal pulses. "   Pulmonary/Chest: No respiratory distress. She has no wheezes.   Abdominal: Soft. Bowel sounds are normal. She exhibits no distension. There is no tenderness.   Musculoskeletal: Normal range of motion. She exhibits no edema or tenderness.   Neurological: No cranial nerve deficit.   Skin: Skin is warm and dry. She is not diaphoretic. No erythema.   Nursing note and vitals reviewed.      Laboratory:  CBC:   Recent Labs   Lab 10/30/18  0422 10/31/18  0413 11/01/18 0447   WBC 10.13 7.04 6.99   RBC 4.17 3.40* 3.94*   HGB 12.3 10.2* 11.8*   HCT 35.3* 28.6* 34.1*   * 105* 120*   MCV 85 84 87   MCH 29.5 30.0 29.9   MCHC 34.8 35.7 34.6     BMP:   Recent Labs   Lab 10/31/18  0733 10/31/18  0737 11/01/18 0447   * 215* 143*   * 132* 134*   K 4.3 4.3 3.9    103 103   CO2 19* 20* 23   BUN 82* 78* 71*   CREATININE 4.2* 4.3* 3.6*   CALCIUM 8.9 9.1 9.0     CMP:   Recent Labs   Lab 10/28/18  2300  10/31/18  0733 10/31/18  0737 11/01/18 0447   *   < > 212* 215* 143*   CALCIUM 8.2*   < > 8.9 9.1 9.0   ALBUMIN 2.3*  --  2.3*  --   --    PROT 6.1  --  5.9*  --   --    *   < > 132* 132* 134*   K 4.5   < > 4.3 4.3 3.9   CO2 12*   < > 19* 20* 23      < > 104 103 103   BUN 93*   < > 82* 78* 71*   CREATININE 5.8*   < > 4.2* 4.3* 3.6*   ALKPHOS 78  --  84  --   --    ALT 49*  --  41  --   --    AST 52*  --  40  --   --     < > = values in this interval not displayed.     ABGs:   Recent Labs   Lab 10/28/18  2317   PH 7.277*   PCO2 23.4*   HCO3 10.9*   POCSATURATED 95   BE -16         Assessment/Plan:     * Diabetic ketoacidosis without coma associated with type 2 diabetes mellitus    - Currently insulin ggt was hold and started on Insulin Aspart and Determir   - Managed per critical care        Long-term use of immunosuppressant medication    - Continue Tacrolimus 3/3  - Will need to continue to monitor daily levels of tacrolimus. Last level 5.3   - Continue with prednisone 5 mg q D  - Hold on  MMF on the setting of recent infection with UTI.   - Once finish treatment for UTI then can start back on MMF  - Will evaluate daily for signs and symptoms of immunosuppression toxicity        History of kidney transplant    - ESRD secondary to uncontrolled blood pressures after pregnancy, which was on HD for 5 years (BRADEN AVF which has been legated), DBD kidney transplant on 09/5/2003 on [facility]   - CKD stage III/IV with a serum creatinine baseline of 1.9-2.9  - Immunosuppression with tacrolimus 4 mg BID,   mg BID and prednisone 5 mg q D.     LISBET (acute kidney injury)    - LISBET superimposed on CKD which has been secondary to pre- renal vs iATN secondary do decreased perfusion due to sepsis and hypotension.   - Serum creatinine continues on downward trend from 4.3---> 3.6 today    - Acid/base: non anion gap metabolic acidosis which mostly secondary to DKA and LISBET has been improving 20---> 23. Continue with sodium bicarbonate 1,300 mg q BID    - Avoid nephrotoxic medications   - Continue to monitor intake and output        Sepsis    - Mostly secondary to UTI which would continue with Abx (currently on Rocephin)   - UC with >100,000 colonies E. Coli ESBL           Tad Arriaga  Nephrology  Fellow  [facility]    Pager [phone]

## 2018-11-01 NOTE — PROGRESS NOTES
[facility]  Kidney Transplant  Progress Note      Reason for Follow-up: Reassessment of  recipient and management of immunosuppression.      Subjective:   History of Present Illness:  Angelica Siegel is a 48 year old woman with past medical history of  ESRD secondary to uncontrolled blood pressures after pregnancy, which was on HD for 5 years (BRADEN AVF which has been legated), DBD kidney transplant on 09/5/2003 on [facility] [address] at [facility]  with CKD stage III with a serum creatinine baseline of 1.4-1.5, on immunosuppression with tacrolimus 4 mg BID,   mg BID and prednisone 5 mg q D. She arrived to [facility] as a transferred from [facility] for further management of an acute kidney injury which increased from baseline to 6 in the setting of admission and treatment for a presumed UTI. The patient was admitted on 10/26/18 and treated with Vancomycin and Levaquin at their facility. Her symptoms began on 10/24/18 which involved general malaise and nausea progressing to fevers and chills with a temperature of 103 at home. Patient received a CT scan at [facility] that made reference to mild perinephric fat stranding in the transplanted right kidney and severe atrophy in the left kidney with no signs of obstruction. Has been presenting with uncontrolled blood glucose which is treated for DKA, with insuline and bicarbonate ggt. KTM was consulted for management of immunosuppression in the setting of LISBET, Sepsis secondary to UTI.     Ms. Siegel is a 48 y.o. year old female who is status post .    Her maintenance immunosuppression consists of:   Immunosuppressants (From admission, onward)    Start     Stop Route Frequency Ordered    11/01/18 0800  tacrolimus capsule 4 mg      -- Oral Daily 10/31/18 1348    10/31/18 1800  tacrolimus capsule 3 mg      -- Oral Daily 10/31/18 1348          Interval History:  Patient evaluated at bedside, no significant event overnight.  "Denies any fever, nausea, vomiting or diarrhea.     Past Medical, Surgical, Family, and Social History:   Unchanged from H&P.    Scheduled Meds:   cefTRIAXone (ROCEPHIN) IVPB  1 g Intravenous Q24H    escitalopram oxalate  10 mg Oral Daily    heparin (porcine)  5,000 Units Subcutaneous Q12H    insulin aspart U-100  6 Units Subcutaneous TIDWM    insulin detemir U-100  35 Units Subcutaneous Daily    magnesium sulfate IVPB  3 g Intravenous Once    predniSONE  5 mg Oral Daily    sodium bicarbonate  1,300 mg Oral TID    tacrolimus  3 mg Oral Daily    tacrolimus  4 mg Oral Daily     Continuous Infusions:  PRN Meds:acetaminophen, dextrose 50%, glucagon (human recombinant), glucose, glucose, insulin aspart U-100, ondansetron, sodium chloride 0.9%    Intake/Output - Last 3 Shifts       10/30 0700 - 10/31 0659 10/31 0700 - 11/01 0659 11/01 0700 - 11/02 0659    P.O. 1100 1060     I.V. (mL/kg) 3.9 (0)      Total Intake(mL/kg) 1103.9 (11.1) 1060 (10.7)     Urine (mL/kg/hr) 3040 (1.3) 2740 (1.1)     Stool 0      Total Output 3040 2740     Net -1936.1 -1680            Urine Occurrence  4 x     Stool Occurrence 1 x             Review of Systems   Objective:     Vital Signs (Most Recent):  Temp: 98.7 °F (37.1 °C) (11/01/18 0755)  Pulse: 80 (11/01/18 0755)  Resp: (!) 22 (11/01/18 0755)  BP: 116/72 (11/01/18 0755)  SpO2: (!) 94 % (11/01/18 0755) Vital Signs (24h Range):  Temp:  [97.5 °F (36.4 °C)-98.7 °F (37.1 °C)] 98.7 °F (37.1 °C)  Pulse:  [66-80] 80  Resp:  [12-22] 22  SpO2:  [93 %-98 %] 94 %  BP: (116-148)/(72-94) 116/72     Weight: 99.4 kg (219 lb 2.2 oz)  Height: 5' 2" (157.5 cm)  Body mass index is 40.08 kg/m².    Physical Exam   Constitutional: She appears well-developed. No distress.   HENT:   Head: Normocephalic and atraumatic.   Eyes: Conjunctivae and EOM are normal.   Neck: Normal range of motion. Neck supple.   Cardiovascular: Normal rate, regular rhythm, normal heart sounds and intact distal pulses. "   Pulmonary/Chest: No respiratory distress. She has no wheezes.   Abdominal: Soft. Bowel sounds are normal. She exhibits no distension. There is no tenderness.   Musculoskeletal: Normal range of motion. She exhibits no edema or tenderness.   Neurological: No cranial nerve deficit.   Skin: Skin is warm and dry. She is not diaphoretic. No erythema.   Nursing note and vitals reviewed.      Laboratory:  CBC:   Recent Labs   Lab 10/30/18  0422 10/31/18  0413 11/01/18 0447   WBC 10.13 7.04 6.99   RBC 4.17 3.40* 3.94*   HGB 12.3 10.2* 11.8*   HCT 35.3* 28.6* 34.1*   * 105* 120*   MCV 85 84 87   MCH 29.5 30.0 29.9   MCHC 34.8 35.7 34.6     BMP:   Recent Labs   Lab 10/31/18  0733 10/31/18  0737 11/01/18 0447   * 215* 143*   * 132* 134*   K 4.3 4.3 3.9    103 103   CO2 19* 20* 23   BUN 82* 78* 71*   CREATININE 4.2* 4.3* 3.6*   CALCIUM 8.9 9.1 9.0     CMP:   Recent Labs   Lab 10/28/18  2300  10/31/18  0733 10/31/18  0737 11/01/18 0447   *   < > 212* 215* 143*   CALCIUM 8.2*   < > 8.9 9.1 9.0   ALBUMIN 2.3*  --  2.3*  --   --    PROT 6.1  --  5.9*  --   --    *   < > 132* 132* 134*   K 4.5   < > 4.3 4.3 3.9   CO2 12*   < > 19* 20* 23      < > 104 103 103   BUN 93*   < > 82* 78* 71*   CREATININE 5.8*   < > 4.2* 4.3* 3.6*   ALKPHOS 78  --  84  --   --    ALT 49*  --  41  --   --    AST 52*  --  40  --   --     < > = values in this interval not displayed.     ABGs:   Recent Labs   Lab 10/28/18  2317   PH 7.277*   PCO2 23.4*   HCO3 10.9*   POCSATURATED 95   BE -16         Assessment/Plan:     * Diabetic ketoacidosis without coma associated with type 2 diabetes mellitus    - Currently insulin ggt was hold and started on Insulin Aspart and Determir   - Managed per critical care        Long-term use of immunosuppressant medication    - Continue Tacrolimus 3/3  - Will need to continue to monitor daily levels of tacrolimus. Last level 5.3   - Continue with prednisone 5 mg q D  - Hold on  MMF on the setting of recent infection with UTI.   - Once finish treatment for UTI then can start back on MMF  - Will evaluate daily for signs and symptoms of immunosuppression toxicity        History of kidney transplant    - ESRD secondary to uncontrolled blood pressures after pregnancy, which was on HD for 5 years (BRADEN AVF which has been legated), DBD kidney transplant on 09/5/2003 on [facility]   - CKD stage III/IV with a serum creatinine baseline of 1.9-2.9  - Immunosuppression with tacrolimus 4 mg BID,   mg BID and prednisone 5 mg q D.     LISBET (acute kidney injury)    - LISBET superimposed on CKD which has been secondary to pre- renal vs iATN secondary do decreased perfusion due to sepsis and hypotension.   - Serum creatinine continues on downward trend from 4.3---> 3.6 today    - Acid/base: non anion gap metabolic acidosis which mostly secondary to DKA and LISBET has been improving 20---> 23. Continue with sodium bicarbonate 1,300 mg q BID    - Avoid nephrotoxic medications   - Continue to monitor intake and output        Sepsis    - Mostly secondary to UTI which would continue with Abx (currently on Rocephin)   - UC with >100,000 colonies E. Coli ESBL           Tad Arriaga  Nephrology  Fellow  [facility]    Pager [phone]

## 2018-11-01 NOTE — PLAN OF CARE
Problem: Patient Care Overview  Goal: Plan of Care Review  Outcome: Ongoing (interventions implemented as appropriate)  Infection: Pt. was receiving IV Abx. UC + for ESBL now. Contact isolation initiated.   MD made aware. Abx was changed. BC completed. ID is consulted.   VSS. Pt. offers no c/o for this shift.   Mag 1.4-repleted with 3 gms of IV Mag.   Safety and pt. care rounds maintained. Chang.

## 2018-11-01 NOTE — SUBJECTIVE & OBJECTIVE
Past Medical History:   Diagnosis Date    Hypertension     Kidney transplant recipient     Post-transplant diabetes mellitus        Past Surgical History:   Procedure Laterality Date    ANKLE FRACTURE SURGERY      AV FISTULA PLACEMENT       SECTION      KIDNEY TRANSPLANT         Review of patient's allergies indicates:   Allergen Reactions    Iron analogues Anaphylaxis    Morphine Other (See Comments)     Burns all over once IVP    Pcn [penicillins] Rash       Medications:  Medications Prior to Admission   Medication Sig    aspirin 81 MG Chew Take 81 mg by mouth once daily.    escitalopram oxalate (LEXAPRO) 20 MG tablet Take 20 mg by mouth once daily.    METOPROLOL TARTRATE ORAL Take by mouth.    mirtazapine (REMERON ORAL) Take by mouth.    mycophenolate (CELLCEPT) 500 mg Tab Take 500 mg by mouth 2 (two) times daily.    predniSONE (DELTASONE) 5 MG tablet Take 5 mg by mouth once daily.    tacrolimus (PROGRAF) 1 MG Cap Take 3 mg by mouth every 12 (twelve) hours.     Antibiotics (From admission, onward)    Start     Stop Route Frequency Ordered    18 1115  ertapenem (INVANZ) 1 g in sodium chloride 0.9 % 100 mL IVPB (ready to mix system)      -- IV Every 24 hours (non-standard times) 18 1012        Antifungals (From admission, onward)    None        Antivirals (From admission, onward)    None             There is no immunization history on file for this patient.    Family History     Problem Relation (Age of Onset)    Nephrotic syndrome Mother        Social History     Socioeconomic History    Marital status: Single     Spouse name: None    Number of children: None    Years of education: None    Highest education level: None   Social Needs    Financial resource strain: None    Food insecurity - worry: None    Food insecurity - inability: None    Transportation needs - medical: None    Transportation needs - non-medical: None   Occupational History    None   Tobacco Use     Smoking status: Never Smoker    Smokeless tobacco: Never Used   Substance and Sexual Activity    Alcohol use: No     Frequency: Never    Drug use: No    Sexual activity: None   Other Topics Concern    None   Social History Narrative    None     Review of Systems   Constitutional: Negative for chills, fatigue and fever.   Respiratory: Negative for cough and shortness of breath.    Gastrointestinal: Negative for abdominal distention, abdominal pain, diarrhea, nausea and vomiting.   Skin: Negative for rash.     Objective:     Vital Signs (Most Recent):  Temp: 97.2 °F (36.2 °C) (11/01/18 1648)  Pulse: 76 (11/01/18 1648)  Resp: 20 (11/01/18 1648)  BP: 109/77 (11/01/18 1648)  SpO2: (!) 94 % (11/01/18 1648) Vital Signs (24h Range):  Temp:  [97.2 °F (36.2 °C)-98.7 °F (37.1 °C)] 97.2 °F (36.2 °C)  Pulse:  [66-80] 76  Resp:  [11-22] 20  SpO2:  [93 %-98 %] 94 %  BP: (107-121)/(72-79) 109/77     Weight: 99.4 kg (219 lb 2.2 oz)  Body mass index is 40.08 kg/m².    Estimated Creatinine Clearance: 20.8 mL/min (A) (based on SCr of 3.6 mg/dL (H)).    Physical Exam   Constitutional: She is oriented to person, place, and time. She appears well-developed and well-nourished.   HENT:   Head: Normocephalic and atraumatic.   Eyes: EOM are normal. Pupils are equal, round, and reactive to light.   Neck: Normal range of motion.   Cardiovascular: Normal rate, regular rhythm and normal heart sounds.   Pulmonary/Chest: Effort normal and breath sounds normal.   Abdominal: Soft. Bowel sounds are normal.   Musculoskeletal: Normal range of motion. She exhibits no edema.   Neurological: She is alert and oriented to person, place, and time.   Skin: No rash noted.       Significant Labs:   Blood Culture: No results for input(s): LABBLOO in the last 4320 hours.  BMP:   Recent Labs   Lab 11/01/18  0447   *   *   K 3.9      CO2 23   BUN 71*   CREATININE 3.6*   CALCIUM 9.0   MG 1.4*     CBC:   Recent Labs   Lab 10/31/18  9609  11/01/18 0447   WBC 7.04 6.99   HGB 10.2* 11.8*   HCT 28.6* 34.1*   * 120*     CMP:   Recent Labs   Lab 10/31/18  0733 10/31/18  0737 11/01/18 0447   * 132* 134*   K 4.3 4.3 3.9    103 103   CO2 19* 20* 23   * 215* 143*   BUN 82* 78* 71*   CREATININE 4.2* 4.3* 3.6*   CALCIUM 8.9 9.1 9.0   PROT 5.9*  --   --    ALBUMIN 2.3*  --   --    BILITOT 0.5  --   --    ALKPHOS 84  --   --    AST 40  --   --    ALT 41  --   --    ANIONGAP 9 9 8   EGFRNONAA 11.7* 11.4* 14.1*     Microbiology Results (last 7 days)     Procedure Component Value Units Date/Time    Blood culture [[id number]] Collected:  11/01/18 1159    Order Status:  Sent Specimen:  Blood Updated:  11/01/18 1311    Blood culture [[id number]]     Order Status:  Canceled Specimen:  Blood     Urine culture [[id number]]  (Susceptibility) Collected:  10/28/18 2147    Order Status:  Completed Specimen:  Urine Updated:  10/31/18 1028     Urine Culture, Routine --     ESCHERICHIA COLI ESBL  >100,000 cfu/ml      Narrative:       Preferred Collection Type->Urine, Clean Catch one yellow top and one   gray top          Significant Imaging: I have reviewed all pertinent imaging results/findings within the past 24 hours.

## 2018-11-01 NOTE — HPI
Case of 47 y/o female with PMhx Kidney tpx 2013 on maintenance tacro/mmf/pred. Patient was transferred from [facility] originally admitted there on 10/24/18 for sepsis and LISBET. [place] [facility] [place] on 10/28/18. During admission at [facility] treated for LISBET, DKA  In the ICU later stepped down after improvement. Patient had been treated with ceftriaxone since admission. ID consulted when U/C taken during admission resulted positive for ESBL E coli.

## 2018-11-01 NOTE — ASSESSMENT & PLAN NOTE
47 y/o female with PMhx Kidney tpx 2013 on maintenance tacro/mmf/pred. During admission to OSH had positive U/C for Proteus mirabilis ESBL sensitive to ertapenem and meropenem, B/C NGTD. Both cultures taken on admission day.  Since admision at [facility] patient condition has improved on ceftriaxone with step down from ICU/ U/C obtained here positive for E coli ESBL. Patient did not have coverage of either isolated organisms but in the setting of a kidney transplant patient that does not have typical urinary symptoms and and LISBET recommend to treat for 2 weeks with ertapenem to cover for pyelonephritis. Recommend to arrange for follow up close to patient's home since lives far from [place]. Thank you for the consult, please call with questions or new developments will sign off.     Recommendations:   - 2 weeks of ertapenem   - Send weekly CBC, BMP to ID clinic fax   - Will need ID follow up in 2 weeks, please schedule preferably close to her home town   - Thanks for the consult will sign off please call with questions or new developments

## 2018-11-01 NOTE — PLAN OF CARE
Plan for possible d/c home today per primary staff once cleared by Kidney Transplant Team,  Pt has no needs.  [facility] will contact pt for f/u appts.        1230  ID consult placed for ESBL in urine.  D/c plans on hold at this time as pt may need home iv antibiotic therapy.    Mariposa Ochoa, RN  Case Management  [phone]

## 2018-11-01 NOTE — ASSESSMENT & PLAN NOTE
- Continue Tacrolimus 3/3  - Will need to continue to monitor daily levels of tacrolimus. Last level 5.3   - Continue with prednisone 5 mg q D  - Hold on MMF on the setting of recent infection with UTI.   - Once finish treatment for UTI then can start back on MMF  - Will evaluate daily for signs and symptoms of immunosuppression toxicity

## 2018-11-01 NOTE — CONSULTS
Consult received. Full note to follow.    Please call for questions.    Thanks,  Megan Caro MD  Infectious Disease Fellow, PGY-5  Pager: [phone] or [phone]: [phone]  [facility]

## 2018-11-01 NOTE — PLAN OF CARE
Problem: Patient Care Overview  Goal: Plan of Care Review  Outcome: Ongoing (interventions implemented as appropriate)  Patient remained in stable condition through shift. Remained free of falls and slept through the night. Bed in lowest and locked position, call light in reach, all questions answered, declines any further needs at this time. Will continue to monitor.

## 2018-11-01 NOTE — ASSESSMENT & PLAN NOTE
- Mostly secondary to UTI which would continue with Abx (currently on Rocephin)   - UC with >100,000 colonies E. Coli ESBL

## 2018-11-01 NOTE — ASSESSMENT & PLAN NOTE
- LISBET superimposed on CKD which has been secondary to pre- renal vs iATN secondary do decreased perfusion due to sepsis and hypotension.   - Serum creatinine continues on downward trend from 4.3---> 3.6 today    - Acid/base: non anion gap metabolic acidosis which mostly secondary to DKA and LISBET has been improving 20---> 23. Continue with sodium bicarbonate 1,300 mg q BID    - Avoid nephrotoxic medications   - Continue to monitor intake and output

## 2018-11-01 NOTE — PROGRESS NOTES
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 Patient was admitted on 10/24 at OSH for sepsis and LISBET, treated with vancomycin and Levaquin but she clinically worsened. Subsequently transferred here and admitted to ICU for severe sepsis, LISBET and DKA. Patient was started on ceftriaxone, and insulin gtt which clinical improvement. Kidney transplant team on board. Remained hemodynamically stable. Insulin drip transitioned to subcutaneous insulin. Downgraded from ICU to PCU on 10/30 to Hospitalist service.     SUBJECTIVE:     Pt was seen and examined at bedside. Pt had no acute events overnight, and no new complaints this morning. Pt remained hemodynamically stable and afebrile. Pt has been tolerating PO intake well without any nausea, vomiting, diarrhea, constipation, blood in stools or trouble urinating. Pt denies any fevers, chills, malaise, headaches, chest pain, SOB, cough. Pt has been able to ambulate without any distress. ESBL e coli noted on final cultures. Contact precautions initiated.     ROS (Positive in Bold, otherwise negative)  Pain Scale: 0 /10   Constitutional: fever, chills, night sweats  CV: chest pain, edema, palpitations  Resp: SOB, cough, sputum production  GI: changes in appetite, NVDC, pain, melena, hematochezia, GERD, hematemesis  :  Dysuria, hematuria, urinary urgency, frequency  MSK: arthralgia/myalgia, joint swelling  SKIN: rashes, pruritis, petechiae   Neuro/Psych, FNDm anxiety, depression      OBJECTIVE:     Vitals:  Temp:  [97.5 °F (36.4 °C)-98.7 °F (37.1 °C)]   Pulse:  [66-80]   Resp:  [12-22]   BP: (116-148)/(72-94)   SpO2:  [93 %-98 %]      I & O (Last 24H):     Intake/Output Summary (Last 24 hours) at 11/1/2018 1106  Last data filed at 11/1/2018 0544  Gross per 24 hour   Intake 1060 ml   Output 2740 ml   Net -1680 ml         GEN: Obese  female in no acute distress. Nontoxic. Resting in chair. Cooperative.  HEENT: NCAT. PERRL. EOMI. Conjunctivae/corneas clear, sclera Anicteric.  CVS: RRR. Normal s1 s2 no murmur, click, rub or gallop  LUNG: CTAB. Normal respiratory effort. No wheezes, rhonchi, or crackles.  ABD: Normoactive BS, soft, NT, ND, no masses or organomegaly.  EXT: No edema. No cyanosis. Full ROM.  SKIN: color, texture, turgor normal. No rashes or lesions  NEURO: Alert, oriented x 4, Spont mvt of all extremities with no focal deficits noted.      All recent labs and imaging has been reviewed.     Recent Results (from the past 24 hour(s))   POCT glucose    Collection Time: 10/31/18 12:14 PM   Result Value Ref Range    POCT Glucose 209 (H) 70 - 110 mg/dL   POCT glucose    Collection Time: 10/31/18  4:32 PM   Result Value Ref Range    POCT Glucose 206 (H) 70 - 110 mg/dL   POCT glucose    Collection Time: 10/31/18  9:32 PM   Result Value Ref Range    POCT Glucose 189 (H) 70 - 110 mg/dL   POCT glucose    Collection Time: 11/01/18  1:23 AM   Result Value Ref Range    POCT Glucose 143 (H) 70 - 110 mg/dL   Tacrolimus level    Collection Time: 11/01/18  4:47 AM   Result Value Ref Range    Tacrolimus Lvl 5.3 5.0 - 15.0 ng/mL   Basic metabolic panel    Collection Time: 11/01/18  4:47 AM   Result Value Ref Range    Sodium 134 (L) 136 - 145 mmol/L    Potassium 3.9 3.5 - 5.1 mmol/L    Chloride 103 95 - 110 mmol/L    CO2 23 23 - 29  mmol/L    Glucose 143 (H) 70 - 110 mg/dL    BUN, Bld 71 (H) 6 - 20 mg/dL    Creatinine 3.6 (H) 0.5 - 1.4 mg/dL    Calcium 9.0 8.7 - 10.5 mg/dL    Anion Gap 8 8 - 16 mmol/L    eGFR if African American 16.2 (A) >60 mL/min/1.73 m^2    eGFR if non  14.1 (A) >60 mL/min/1.73 m^2   CBC auto differential    Collection Time: 11/01/18  4:47 AM   Result Value Ref Range    WBC 6.99 3.90 - 12.70 K/uL    RBC 3.94 (L) 4.00 - 5.40 M/uL    Hemoglobin 11.8 (L) 12.0 - 16.0 g/dL    Hematocrit 34.1 (L) 37.0 - 48.5 %    MCV 87 82 - 98 fL    MCH 29.9 27.0 - 31.0 pg    MCHC 34.6 32.0 - 36.0 g/dL    RDW 12.7 11.5 - 14.5 %    Platelets 120 (L) 150 - 350 K/uL    MPV 11.3 9.2 - 12.9 fL    Immature Granulocytes 4.7 (H) 0.0 - 0.5 %    Gran # (ANC) 4.5 1.8 - 7.7 K/uL    Immature Grans (Abs) 0.33 (H) 0.00 - 0.04 K/uL    Lymph # 1.3 1.0 - 4.8 K/uL    Mono # 0.7 0.3 - 1.0 K/uL    Eos # 0.1 0.0 - 0.5 K/uL    Baso # 0.04 0.00 - 0.20 K/uL    nRBC 0 0 /100 WBC    Gran% 64.1 38.0 - 73.0 %    Lymph% 19.2 18.0 - 48.0 %    Mono% 10.0 4.0 - 15.0 %    Eosinophil% 1.4 0.0 - 8.0 %    Basophil% 0.6 0.0 - 1.9 %    Differential Method Automated    Magnesium    Collection Time: 11/01/18  4:47 AM   Result Value Ref Range    Magnesium 1.4 (L) 1.6 - 2.6 mg/dL   Phosphorus    Collection Time: 11/01/18  4:47 AM   Result Value Ref Range    Phosphorus 3.7 2.7 - 4.5 mg/dL   POCT glucose    Collection Time: 11/01/18  8:03 AM   Result Value Ref Range    POCT Glucose 150 (H) 70 - 110 mg/dL       Recent Labs   Lab 10/31/18  0805 10/31/18  1214 10/31/18  1632 10/31/18  2132 11/01/18  0123 11/01/18  0803   POCTGLUCOSE 196* 209* 206* 189* 143* 150*       Hemoglobin A1C   Date Value Ref Range Status   10/29/2018 9.5 (H) 4.0 - 5.6 % Final     Comment:     ADA Screening Guidelines:  5.7-6.4%  Consistent with prediabetes  >or=6.5%  Consistent with diabetes  High levels of fetal hemoglobin interfere with the HbA1C  assay. Heterozygous hemoglobin variants (HbS, HgC,  etc)do  not significantly interfere with this assay.   However, presence of multiple variants may affect accuracy.          Active Hospital Problems    Diagnosis  POA    *Diabetic ketoacidosis without coma associated with type 2 diabetes mellitus [E11.10]  Yes    Hyponatremia [E87.1]  Yes    Thrombocytopenia [D69.6]  Yes    Normocytic anemia [D64.9]  Yes    Hyperglycemia [R73.9]  Yes    UTI (urinary tract infection) [N39.0]  Unknown    DKA (diabetic ketoacidoses) [E13.10]  Yes    Long-term use of immunosuppressant medication [Z79.899]  Not Applicable    Sepsis [A41.9]  Yes    LISBET (acute kidney injury) [N17.9]  Yes    History of kidney transplant [Z94.0]  Not Applicable    Immunosuppression [D89.9]  Yes      Resolved Hospital Problems    Diagnosis Date Resolved POA    Hypomagnesemia [E83.42] 10/30/2018 Yes    Dehydration [E86.0] 10/30/2018 Yes          ASSESSMENT AND PLAN:     Diabetic ketoacidosis likely secondary to UTI   Presented with fevers, hyperglycemia, ketonemia, AGMA. Clinically improved after insulin drip and antibiotics. Patient transitioned from insulin gtt to subcutaneous insulin as DKA resolved. AGMA resolved and gap closed.  Continue insulin detemir to 35, reviewed glucose, good control  Continue to treat UTI.  Strict In/outs  DM diet    Sepsis secondary to UTI  Remains afebrile and hemodynamically stable.  UCx with >100,000 colonies of E. Coli ESBL  Contact precautions initiated   Transplant ID consulted. Appreciate input.   Changed ceftriaxone to Ertapenem for now.   Blood cultures taken  Likely would need PICC line and prolonged course of IV antibiotics  Her only current access is right central line as she is a hard stick     Hypomagnesemia   Replete and recheck.     Acute Kidney Injury superimposed on CKD   History of kidney transplant on chronic immunosuppressants  Kidney transplant team on board  Creatine gradually improving, currently 3.6, baseline ~2.9  Continuing tacrolimus with  daily lvls  Continue prednisone 5 mg daily  Holding MMF due to infection/sepsis, restart as per KTM    Non anion gap Metabolic Acidosis - resolved  Anion gap metabolic acidosis on admission resolved as DKA resolved  Non anion gap likely from LISBET as + uAG  Resolved on Sodium Bicarb as per KTM    Normocytic Anemia  Back up to baseline. Hemodynamically stable   Patient has no external signs of bleeding    Hyponatremia  Noted. Will continue to monitor.     Thrombocytopenia  Noted. No signs of bleeding noted although hgb decreased  Suspecting reactive. Improving.     Anxiety   Continue escitalopram 10 mg daily        Prophylaxis- Hep BID    Code Status- Full Code     Discharge plan and follow up - d/c home once medically stable, patient lives about 4 hrs away.     Yamil Thompson MD  Hospital Medicine Staff  Pager [phone]

## 2018-11-01 NOTE — ASSESSMENT & PLAN NOTE
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

## 2018-11-01 NOTE — CONSULTS
[facility]  Infectious Disease  Consult Note    Patient Name: Bhumi Siegel  MRN: [id number]  Admission Date: 10/28/2018  Hospital Length of Stay: 4 days  Attending Physician: Yamil Thompson MD  Primary Care Provider: Primary Doctor No     Isolation Status: Contact    Patient information was obtained from patient and ER records.      Consults  Assessment/Plan:     UTI (urinary tract infection)    49 y/o female with PMhx Kidney tpx 2013 on maintenance tacro/mmf/pred. During admission to OSH had positive U/C for Proteus mirabilis ESBL sensitive to ertapenem and meropenem, B/C NGTD. Both cultures taken on admission day.  Since admision at [facility] patient condition has improved on ceftriaxone with step down from ICU/ U/C obtained here positive for E coli ESBL. Patient did not have coverage of either isolated organisms but in the setting of a kidney transplant patient that does not have typical urinary symptoms and and LISBET recommend to treat for 2 weeks with ertapenem to cover for pyelonephritis. Recommend to arrange for follow up close to patient's home since lives far from [place]. Thank you for the consult, please call with questions or new developments will sign off.     Recommendations:   - 2 weeks of ertapenem   - Send weekly CBC, BMP to ID clinic fax   - Will need ID follow up in 2 weeks, please schedule preferably close to her home town   - Thanks for the consult will sign off please call with questions or new developments              Thank you for your consult. I will sign off. Please contact us if you have any additional questions.    Megan Montana MD  Infectious Disease  [facility]    Subjective:     Principal Problem: Diabetic ketoacidosis without coma associated with type 2 diabetes mellitus    HPI: Case of 49 y/o female with PMhx Kidney tpx 2013 on maintenance tacro/mmf/pred. Patient was transferred from [facility] originally  admitted there on 10/24/18 for sepsis and LISBET. Transferred to [facility] for higher level of care on 10/28/18. During admission at [facility] treated for LISBET, DKA  In the ICU later stepped down after improvement. Patient had been treated with ceftriaxone since admission. ID consulted when U/C taken during admission resulted positive for ESBL E coli.     Past Medical History:   Diagnosis Date    Hypertension     Kidney transplant recipient     Post-transplant diabetes mellitus        Past Surgical History:   Procedure Laterality Date    ANKLE FRACTURE SURGERY      AV FISTULA PLACEMENT       SECTION      KIDNEY TRANSPLANT         Review of patient's allergies indicates:   Allergen Reactions    Iron analogues Anaphylaxis    Morphine Other (See Comments)     Burns all over once IVP    Pcn [penicillins] Rash       Medications:  Medications Prior to Admission   Medication Sig    aspirin 81 MG Chew Take 81 mg by mouth once daily.    escitalopram oxalate (LEXAPRO) 20 MG tablet Take 20 mg by mouth once daily.    METOPROLOL TARTRATE ORAL Take by mouth.    mirtazapine (REMERON ORAL) Take by mouth.    mycophenolate (CELLCEPT) 500 mg Tab Take 500 mg by mouth 2 (two) times daily.    predniSONE (DELTASONE) 5 MG tablet Take 5 mg by mouth once daily.    tacrolimus (PROGRAF) 1 MG Cap Take 3 mg by mouth every 12 (twelve) hours.     Antibiotics (From admission, onward)    Start     Stop Route Frequency Ordered    18 1115  ertapenem (INVANZ) 1 g in sodium chloride 0.9 % 100 mL IVPB (ready to mix system)      -- IV Every 24 hours (non-standard times) 18 1012        Antifungals (From admission, onward)    None        Antivirals (From admission, onward)    None             There is no immunization history on file for this patient.    Family History     Problem Relation (Age of Onset)    Nephrotic syndrome Mother        Social History     Socioeconomic History    Marital status: Single     Spouse name: None     Number of children: None    Years of education: None    Highest education level: None   Social Needs    Financial resource strain: None    Food insecurity - worry: None    Food insecurity - inability: None    Transportation needs - medical: None    Transportation needs - non-medical: None   Occupational History    None   Tobacco Use    Smoking status: Never Smoker    Smokeless tobacco: Never Used   Substance and Sexual Activity    Alcohol use: No     Frequency: Never    Drug use: No    Sexual activity: None   Other Topics Concern    None   Social History Narrative    None     Review of Systems   Constitutional: Negative for chills, fatigue and fever.   Respiratory: Negative for cough and shortness of breath.    Gastrointestinal: Negative for abdominal distention, abdominal pain, diarrhea, nausea and vomiting.   Skin: Negative for rash.     Objective:     Vital Signs (Most Recent):  Temp: 97.2 °F (36.2 °C) (11/01/18 1648)  Pulse: 76 (11/01/18 1648)  Resp: 20 (11/01/18 1648)  BP: 109/77 (11/01/18 1648)  SpO2: (!) 94 % (11/01/18 1648) Vital Signs (24h Range):  Temp:  [97.2 °F (36.2 °C)-98.7 °F (37.1 °C)] 97.2 °F (36.2 °C)  Pulse:  [66-80] 76  Resp:  [11-22] 20  SpO2:  [93 %-98 %] 94 %  BP: (107-121)/(72-79) 109/77     Weight: 99.4 kg (219 lb 2.2 oz)  Body mass index is 40.08 kg/m².    Estimated Creatinine Clearance: 20.8 mL/min (A) (based on SCr of 3.6 mg/dL (H)).    Physical Exam   Constitutional: She is oriented to person, place, and time. She appears well-developed and well-nourished.   HENT:   Head: Normocephalic and atraumatic.   Eyes: EOM are normal. Pupils are equal, round, and reactive to light.   Neck: Normal range of motion.   Cardiovascular: Normal rate, regular rhythm and normal heart sounds.   Pulmonary/Chest: Effort normal and breath sounds normal.   Abdominal: Soft. Bowel sounds are normal.   Musculoskeletal: Normal range of motion. She exhibits no edema.   Neurological: She is alert and  oriented to person, place, and time.   Skin: No rash noted.       Significant Labs:   Blood Culture: No results for input(s): LABBLOO in the last 4320 hours.  BMP:   Recent Labs   Lab 11/01/18  0447   *   *   K 3.9      CO2 23   BUN 71*   CREATININE 3.6*   CALCIUM 9.0   MG 1.4*     CBC:   Recent Labs   Lab 10/31/18  0413 11/01/18 0447   WBC 7.04 6.99   HGB 10.2* 11.8*   HCT 28.6* 34.1*   * 120*     CMP:   Recent Labs   Lab 10/31/18  0733 10/31/18  0737 11/01/18 0447   * 132* 134*   K 4.3 4.3 3.9    103 103   CO2 19* 20* 23   * 215* 143*   BUN 82* 78* 71*   CREATININE 4.2* 4.3* 3.6*   CALCIUM 8.9 9.1 9.0   PROT 5.9*  --   --    ALBUMIN 2.3*  --   --    BILITOT 0.5  --   --    ALKPHOS 84  --   --    AST 40  --   --    ALT 41  --   --    ANIONGAP 9 9 8   EGFRNONAA 11.7* 11.4* 14.1*     Microbiology Results (last 7 days)     Procedure Component Value Units Date/Time    Blood culture [[id number]] Collected:  11/01/18 1159    Order Status:  Sent Specimen:  Blood Updated:  11/01/18 1311    Blood culture [[id number]]     Order Status:  Canceled Specimen:  Blood     Urine culture [[id number]]  (Susceptibility) Collected:  10/28/18 2147    Order Status:  Completed Specimen:  Urine Updated:  10/31/18 1028     Urine Culture, Routine --     ESCHERICHIA COLI ESBL  >100,000 cfu/ml      Narrative:       Preferred Collection Type->Urine, Clean Catch one yellow top and one   gray top          Significant Imaging: I have reviewed all pertinent imaging results/findings within the past 24 hours.

## 2018-11-02 VITALS
BODY MASS INDEX: 40.33 KG/M2 | DIASTOLIC BLOOD PRESSURE: 68 MMHG | SYSTOLIC BLOOD PRESSURE: 116 MMHG | HEART RATE: 64 BPM | RESPIRATION RATE: 20 BRPM | HEIGHT: 62 IN | OXYGEN SATURATION: 96 % | TEMPERATURE: 99 F | WEIGHT: 219.13 LBS

## 2018-11-02 PROBLEM — E11.10 DKA (DIABETIC KETOACIDOSES): Status: RESOLVED | Noted: 2018-10-30 | Resolved: 2018-11-02

## 2018-11-02 PROBLEM — A41.9 SEPSIS: Status: RESOLVED | Noted: 2018-10-28 | Resolved: 2018-11-02

## 2018-11-02 PROBLEM — N17.9 AKI (ACUTE KIDNEY INJURY): Status: RESOLVED | Noted: 2018-10-28 | Resolved: 2018-11-02

## 2018-11-02 PROBLEM — E87.1 HYPONATREMIA: Status: RESOLVED | Noted: 2018-10-31 | Resolved: 2018-11-02

## 2018-11-02 LAB
ANION GAP SERPL CALC-SCNC: 9 MMOL/L
BASOPHILS NFR BLD: 0 %
BUN SERPL-MCNC: 58 MG/DL
CALCIUM SERPL-MCNC: 9 MG/DL
CHLORIDE SERPL-SCNC: 103 MMOL/L
CO2 SERPL-SCNC: 25 MMOL/L
CREAT SERPL-MCNC: 3 MG/DL
DIFFERENTIAL METHOD: ABNORMAL
EOSINOPHIL NFR BLD: 5 %
ERYTHROCYTE [DISTWIDTH] IN BLOOD BY AUTOMATED COUNT: 12.6 %
EST. GFR  (AFRICAN AMERICAN): 20.3 ML/MIN/1.73 M^2
EST. GFR  (NON AFRICAN AMERICAN): 17.6 ML/MIN/1.73 M^2
GLUCOSE SERPL-MCNC: 98 MG/DL
HCT VFR BLD AUTO: 33.8 %
HGB BLD-MCNC: 11.7 G/DL
IMM GRANULOCYTES # BLD AUTO: ABNORMAL K/UL
IMM GRANULOCYTES NFR BLD AUTO: ABNORMAL %
LYMPHOCYTES NFR BLD: 24 %
MAGNESIUM SERPL-MCNC: 2 MG/DL
MCH RBC QN AUTO: 29.6 PG
MCHC RBC AUTO-ENTMCNC: 34.6 G/DL
MCV RBC AUTO: 86 FL
METAMYELOCYTES NFR BLD MANUAL: 2 %
MONOCYTES NFR BLD: 13 %
NEUTROPHILS NFR BLD: 56 %
NRBC BLD-RTO: 0 /100 WBC
PHOSPHATE SERPL-MCNC: 4.4 MG/DL
PLATELET # BLD AUTO: 143 K/UL
PLATELET BLD QL SMEAR: ABNORMAL
PMV BLD AUTO: 10.4 FL
POCT GLUCOSE: 109 MG/DL (ref 70–110)
POCT GLUCOSE: 110 MG/DL (ref 70–110)
POCT GLUCOSE: 140 MG/DL (ref 70–110)
POCT GLUCOSE: 186 MG/DL (ref 70–110)
POTASSIUM SERPL-SCNC: 3.5 MMOL/L
RBC # BLD AUTO: 3.95 M/UL
SODIUM SERPL-SCNC: 137 MMOL/L
TACROLIMUS BLD-MCNC: 5.5 NG/ML
WBC # BLD AUTO: 7.16 K/UL

## 2018-11-02 PROCEDURE — 83735 ASSAY OF MAGNESIUM: CPT

## 2018-11-02 PROCEDURE — 25000003 PHARM REV CODE 250: Performed by: INTERNAL MEDICINE

## 2018-11-02 PROCEDURE — 99232 SBSQ HOSP IP/OBS MODERATE 35: CPT | Mod: ,,, | Performed by: INTERNAL MEDICINE

## 2018-11-02 PROCEDURE — 80197 ASSAY OF TACROLIMUS: CPT

## 2018-11-02 PROCEDURE — 36569 INSJ PICC 5 YR+ W/O IMAGING: CPT

## 2018-11-02 PROCEDURE — 99239 HOSP IP/OBS DSCHRG MGMT >30: CPT | Mod: ,,, | Performed by: INTERNAL MEDICINE

## 2018-11-02 PROCEDURE — 85007 BL SMEAR W/DIFF WBC COUNT: CPT

## 2018-11-02 PROCEDURE — 63600175 PHARM REV CODE 636 W HCPCS: Performed by: INTERNAL MEDICINE

## 2018-11-02 PROCEDURE — 85027 COMPLETE CBC AUTOMATED: CPT

## 2018-11-02 PROCEDURE — C1751 CATH, INF, PER/CENT/MIDLINE: HCPCS

## 2018-11-02 PROCEDURE — 76937 US GUIDE VASCULAR ACCESS: CPT

## 2018-11-02 PROCEDURE — 63600175 PHARM REV CODE 636 W HCPCS: Performed by: STUDENT IN AN ORGANIZED HEALTH CARE EDUCATION/TRAINING PROGRAM

## 2018-11-02 PROCEDURE — 25000003 PHARM REV CODE 250: Performed by: STUDENT IN AN ORGANIZED HEALTH CARE EDUCATION/TRAINING PROGRAM

## 2018-11-02 PROCEDURE — 80048 BASIC METABOLIC PNL TOTAL CA: CPT

## 2018-11-02 PROCEDURE — 84100 ASSAY OF PHOSPHORUS: CPT

## 2018-11-02 RX ORDER — TACROLIMUS 1 MG/1
4 CAPSULE ORAL DAILY
Qty: 120 CAPSULE | Refills: 11 | Status: SHIPPED | OUTPATIENT
Start: 2018-11-02 | End: 2023-07-06

## 2018-11-02 RX ORDER — TACROLIMUS 1 MG/1
3 CAPSULE ORAL DAILY
Qty: 90 CAPSULE | Refills: 11 | Status: SHIPPED | OUTPATIENT
Start: 2018-11-02 | End: 2019-11-02

## 2018-11-02 RX ORDER — INSULIN ASPART 100 [IU]/ML
6 INJECTION, SOLUTION INTRAVENOUS; SUBCUTANEOUS 3 TIMES DAILY
Qty: 16.2 ML | Refills: 3 | Status: SHIPPED | OUTPATIENT
Start: 2018-11-02 | End: 2019-11-02

## 2018-11-02 RX ORDER — SODIUM BICARBONATE 650 MG/1
1300 TABLET ORAL 3 TIMES DAILY
Qty: 180 TABLET | Refills: 11 | COMMUNITY
Start: 2018-11-02 | End: 2023-07-06

## 2018-11-02 RX ORDER — ESCITALOPRAM OXALATE 10 MG/1
10 TABLET ORAL DAILY
Qty: 30 TABLET | Refills: 11 | Status: SHIPPED | OUTPATIENT
Start: 2018-11-02 | End: 2019-11-02

## 2018-11-02 RX ADMIN — ESCITALOPRAM OXALATE 10 MG: 10 TABLET ORAL at 08:11

## 2018-11-02 RX ADMIN — HEPARIN SODIUM 5000 UNITS: 5000 INJECTION, SOLUTION INTRAVENOUS; SUBCUTANEOUS at 08:11

## 2018-11-02 RX ADMIN — TACROLIMUS 4 MG: 1 CAPSULE ORAL at 08:11

## 2018-11-02 RX ADMIN — ERTAPENEM SODIUM 1 G: 1 INJECTION, POWDER, LYOPHILIZED, FOR SOLUTION INTRAMUSCULAR; INTRAVENOUS at 11:11

## 2018-11-02 RX ADMIN — SODIUM BICARBONATE 650 MG TABLET 1300 MG: at 08:11

## 2018-11-02 RX ADMIN — INSULIN ASPART 6 UNITS: 100 INJECTION, SOLUTION INTRAVENOUS; SUBCUTANEOUS at 11:11

## 2018-11-02 RX ADMIN — INSULIN ASPART 6 UNITS: 100 INJECTION, SOLUTION INTRAVENOUS; SUBCUTANEOUS at 08:11

## 2018-11-02 RX ADMIN — PREDNISONE 5 MG: 2.5 TABLET ORAL at 09:11

## 2018-11-02 NOTE — PLAN OF CARE
Plan for home iv antibiotics, pt is in agreement with home administration.  Has no preference regarding agency.  [facility] referral placed in right care.    Pt states she cannot f/u with ID here.  Will f/u with Dr Charlotte Anderson in [place].  Office notified at [phone].  Nurse will discuss with Dr Anderson and contact this CM with f/u appt and fax number for lab results.      Mariposa Ochoa RN  Case Management  [phone]

## 2018-11-02 NOTE — SUBJECTIVE & OBJECTIVE
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 She arrived to [facility] as a transferred from [facility] for further management of an acute kidney injury which increased from baseline to 6 in the setting of admission and treatment for a presumed UTI. The patient was admitted on 10/26/18 and treated with Vancomycin and Levaquin at their facility. Her symptoms began on 10/24/18 which involved general malaise and nausea progressing to fevers and chills with a temperature of 103 at home. Patient received a CT scan at [facility] that made reference to mild perinephric fat stranding in the transplanted right kidney and severe atrophy in the left kidney with no signs of obstruction. Has been presenting with uncontrolled blood glucose which is treated for DKA, with insuline and bicarbonate ggt. KTM was consulted for management of immunosuppression in the setting of LISBET, Sepsis secondary to UTI.     Ms. Siegel is a 48 y.o. year old female who is status post .    Her maintenance immunosuppression consists of:   Immunosuppressants (From admission, onward)    Start     Stop Route Frequency Ordered    11/01/18 0800  tacrolimus capsule 4 mg      -- Oral Daily 10/31/18 1348    10/31/18 1800  tacrolimus capsule 3 mg      -- Oral Daily 10/31/18 1348          Hospital Course:  No notes on file    Interval History:  Patient evaluated at bedside, no significant event overnight     Past Medical, Surgical, Family, and Social History:   Unchanged from H&P.    Scheduled Meds:   ertapenem (INVANZ) IVPB   "1 g Intravenous Q24H    escitalopram oxalate  10 mg Oral Daily    heparin (porcine)  5,000 Units Subcutaneous Q12H    insulin aspart U-100  6 Units Subcutaneous TIDWM    insulin detemir U-100  35 Units Subcutaneous Daily    predniSONE  5 mg Oral Daily    sodium bicarbonate  1,300 mg Oral TID    tacrolimus  3 mg Oral Daily    tacrolimus  4 mg Oral Daily     Continuous Infusions:  PRN Meds:acetaminophen, dextrose 50%, glucagon (human recombinant), glucose, glucose, insulin aspart U-100, ondansetron, sodium chloride 0.9%    Intake/Output - Last 3 Shifts       10/31 0700 - 11/01 0659 11/01 0700 - 11/02 0659 11/02 0700 - 11/03 0659    P.O. 1060 800     I.V. (mL/kg)  250 (2.5)     IV Piggyback  100     Total Intake(mL/kg) 1060 (10.7) 1150 (11.6)     Urine (mL/kg/hr) 2740 (1.1) 1200 (0.5)     Stool       Total Output 2740 1200     Net -1680 -50            Urine Occurrence 4 x 4 x            Review of Systems   Objective:     Vital Signs (Most Recent):  Temp: 97.6 °F (36.4 °C) (11/02/18 0500)  Pulse: 68 (11/02/18 0400)  Resp: 16 (11/02/18 0400)  BP: 113/79 (11/02/18 0400)  SpO2: (!) 94 % (11/02/18 0400) Vital Signs (24h Range):  Temp:  [97.2 °F (36.2 °C)-98.5 °F (36.9 °C)] 97.6 °F (36.4 °C)  Pulse:  [63-78] 68  Resp:  [11-22] 16  SpO2:  [93 %-95 %] 94 %  BP: (107-114)/(75-80) 113/79     Weight: 99.4 kg (219 lb 2.2 oz)  Height: 5' 2" (157.5 cm)  Body mass index is 40.08 kg/m².    Physical Exam   Constitutional: She appears well-developed. No distress.   HENT:   Head: Normocephalic and atraumatic.   Eyes: Conjunctivae and EOM are normal.   Neck: Normal range of motion. Neck supple.   Cardiovascular: Normal rate, regular rhythm, normal heart sounds and intact distal pulses.   Pulmonary/Chest: No respiratory distress. She has no wheezes.   Abdominal: Soft. Bowel sounds are normal. She exhibits no distension. There is no tenderness.   Musculoskeletal: Normal range of motion. She exhibits no edema or tenderness. "   Neurological: No cranial nerve deficit.   Skin: Skin is warm and dry. She is not diaphoretic. No erythema.   Nursing note and vitals reviewed.      Laboratory:  CBC:   Recent Labs   Lab 10/31/18  0413 11/01/18 0447 11/02/18 0454   WBC 7.04 6.99 7.16   RBC 3.40* 3.94* 3.95*   HGB 10.2* 11.8* 11.7*   HCT 28.6* 34.1* 33.8*   * 120* 143*   MCV 84 87 86   MCH 30.0 29.9 29.6   MCHC 35.7 34.6 34.6     BMP:   Recent Labs   Lab 10/31/18  0737 11/01/18 0447 11/02/18 0454   * 143* 98   * 134* 137   K 4.3 3.9 3.5    103 103   CO2 20* 23 25   BUN 78* 71* 58*   CREATININE 4.3* 3.6* 3.0*   CALCIUM 9.1 9.0 9.0     CMP:   Recent Labs   Lab 10/28/18  2300  10/31/18  0733 10/31/18  0737 11/01/18 0447 11/02/18 0454   *   < > 212* 215* 143* 98   CALCIUM 8.2*   < > 8.9 9.1 9.0 9.0   ALBUMIN 2.3*  --  2.3*  --   --   --    PROT 6.1  --  5.9*  --   --   --    *   < > 132* 132* 134* 137   K 4.5   < > 4.3 4.3 3.9 3.5   CO2 12*   < > 19* 20* 23 25      < > 104 103 103 103   BUN 93*   < > 82* 78* 71* 58*   CREATININE 5.8*   < > 4.2* 4.3* 3.6* 3.0*   ALKPHOS 78  --  84  --   --   --    ALT 49*  --  41  --   --   --    AST 52*  --  40  --   --   --     < > = values in this interval not displayed.     ABGs:   Recent Labs   Lab 10/28/18  2317   PH 7.277*   PCO2 23.4*   HCO3 10.9*   POCSATURATED 95   BE -16

## 2018-11-02 NOTE — PLAN OF CARE
[facility]    HOME HEALTH ORDERS  FACE TO FACE ENCOUNTER      Patient Name: Bhumi Siegel  YOB: 1970    PCP: Primary Doctor No   PCP Address: None  PCP Phone Number: None  PCP Fax: None    Encounter Date: 11/02/2018    Admit to Home Health: Home infusion therapy (ertapenum 500 mg q24 hrs ) and labs (CBC and CMP, please fax lab results to Dr. Charlotte Anderson @ [phone])    Diagnoses:  Active Hospital Problems    Diagnosis  POA    *Diabetic ketoacidosis without coma associated with type 2 diabetes mellitus [E11.10]  Yes     Priority: 1 - High    Pyelonephritis [N12]  Yes     Priority: 2      Graft      Hyponatremia [E87.1]  Yes    Thrombocytopenia [D69.6]  Yes    Normocytic anemia [D64.9]  Yes    Hyperglycemia [R73.9]  Yes    UTI (urinary tract infection) [N39.0]  Yes    DKA (diabetic ketoacidoses) [E13.10]  Yes    Long-term use of immunosuppressant medication [Z79.899]  Not Applicable    Sepsis [A41.9]  Yes    LISBET (acute kidney injury) [N17.9]  Yes    History of kidney transplant [Z94.0]  Not Applicable    Immunosuppression [D89.9]  Yes      Resolved Hospital Problems    Diagnosis Date Resolved POA    Hypomagnesemia [E83.42] 10/30/2018 Yes    Dehydration [E86.0] 10/30/2018 Yes       No future appointments.      I have seen and examined this patient face to face today. My clinical findings that support the need for the home health skilled services for home IV infusion therapy and lab work.     Allergies:  Review of patient's allergies indicates:   Allergen Reactions    Iron analogues Anaphylaxis    Morphine Other (See Comments)     Burns all over once IVP    Pcn [penicillins] Rash       Diet: diabetic diet: 2000 calorie    Activities: activity as tolerated    Nursing:   SN to complete comprehensive assessment including routine vital signs. Instruct on disease process and s/s of complications to report to MD. Review/verify medication list sent home with the  patient at time of discharge  and instruct patient/caregiver as needed. Frequency may be adjusted depending on start of care date.    Notify MD if SBP > 180 or < 90; DBP > 100 or < 50; HR > 120 or < 50; Temp > 101;      MISCELLANEOUS CARE:  Home Infusion Therapy:   SN to perform Infusion Therapy/Central Line Care.  Review Central Line Care & Central Line Flush with patient. CMP and CBC in one week. Please fax lab results to Dr. Charlotte Anderson @ [phone].     Administer (drug and dose): Ertapenum 500 mg q24 hrs  (renally dosed)  Last dose given:                          Home dose due:     Scrub the Hub: Prior to accessing the line, always perform a 30 second alcohol scrub  Each lumen of the central line is to be flushed at least daily with 10 mL Normal Saline and 3 mL Heparin flush (10 units/mL)  Skilled Nurse (SN) may draw blood from IV access  Blood Draw Procedure:   - Aspirate at least 5 mL of blood   - Discard   - Obtain specimen   - Change injection cap   - Flush with 20 mL Normal Saline followed by a                 3-5 mL Heparin flush (10 units/mL)  Central :   - Sterile dressing changes are done weekly and as needed.   - Use chlor-hexadine scrub to cleanse site, apply Biopatch to insertion site,       apply securement device dressing   - Injection caps are changed weekly and after EVERY lab draw.   - If sterile gauze is under dressing to control oozing,                 dressing change must be performed every 24 hours until gauze is not needed.      Medications: Review discharge medications with patient and family and provide education.      Current Discharge Medication List      START taking these medications    Details   ertapenem sodium (ERTAPENEM, INVANZ, 1 G/100 ML NS, READY TO MIX,) Inject 50 mLs (0.5 g total) into the vein once daily. for 13 days      insulin aspart U-100 (NOVOLOG) 100 unit/mL InPn pen Inject 6 Units into the skin 3 (three) times daily.  Qty: 16.2 mL, Refills: 3       insulin detemir U-100 (LEVEMIR FLEXTOUCH) 100 unit/mL (3 mL) SubQ InPn pen Inject 35 Units into the skin once daily.  Qty: 31.5 mL, Refills: 3      sodium bicarbonate 650 MG tablet Take 2 tablets (1,300 mg total) by mouth 3 (three) times daily.  Qty: 180 tablet, Refills: 11         CONTINUE these medications which have CHANGED    Details   escitalopram oxalate (LEXAPRO) 10 MG tablet Take 1 tablet (10 mg total) by mouth once daily.  Qty: 30 tablet, Refills: 11      !! tacrolimus (PROGRAF) 1 MG Cap Take 3 capsules (3 mg total) by mouth once daily.  Qty: 90 capsule, Refills: 11      !! tacrolimus (PROGRAF) 1 MG Cap Take 4 capsules (4 mg total) by mouth once daily.  Qty: 120 capsule, Refills: 11       !! - Potential duplicate medications found. Please discuss with provider.      CONTINUE these medications which have NOT CHANGED    Details   aspirin 81 MG Chew Take 81 mg by mouth once daily.      mirtazapine (REMERON ORAL) Take by mouth.      predniSONE (DELTASONE) 5 MG tablet Take 5 mg by mouth once daily.         STOP taking these medications       METOPROLOL TARTRATE ORAL Comments:   Reason for Stopping:         mycophenolate (CELLCEPT) 500 mg Tab Comments:   Reason for Stopping:               I certify that this patient is confined to her home and needs intermittent skilled nursing care.

## 2018-11-02 NOTE — PLAN OF CARE
Problem: Patient Care Overview  Goal: Plan of Care Review  Outcome: Ongoing (interventions implemented as appropriate)  Patient removed IV fluids , threw pump , refusing fluids,

## 2018-11-02 NOTE — PLAN OF CARE
Problem: Patient Care Overview  Goal: Plan of Care Review  Outcome: Ongoing (interventions implemented as appropriate)  Patient remained in stable condition through shift. Remained free of falls and slept off and on throughout the night, stating that she typically has trouble sleeping. CBS checked Q4, morning labs drawn and sent to lab. Bed in lowest and locked position, call light in reach, all questions answered, declines any further needs at this time. Will continue to monitor.

## 2018-11-02 NOTE — NURSING
Patient updated delay r/t change in antibiotics  500 mg instead of 1GM  Every day added extension to Midline catheter , flushed well

## 2018-11-02 NOTE — ASSESSMENT & PLAN NOTE
- Mostly secondary to UTI which would continue with Abx , was evaluated buy ID in which recommended Ertapenem for 2 weeks in which last dose will be on 11/15/2018  - UC with >100,000 colonies E. Coli ESBL

## 2018-11-02 NOTE — CONSULTS
Single lumen 18G x 8CM  midline placed right cephalic vein. Max dwell date 12/1/18, Lot#[id number].  Needle advanced into the vessel under real time ultrasound guidance.  Image recorded and saved.

## 2018-11-02 NOTE — NURSING
Patient discharged  Home with antibiotics  With instructions  And home health assistance for home therapy

## 2018-11-02 NOTE — PROGRESS NOTES
Central line removed with sterile procedures     Placed patient in supine position.  Using sterile procedure, sutures removed  As catheter is removed ask patient to preform Valsalva maneuver or to hold  breath.  Immediately cover area with sterile guaze to apply pressure to area.  Covered the site with occlusive drsging while patient is still reforming Valsalva  Maneuver.  Reposition patient. Tolerated well.

## 2018-11-02 NOTE — ASSESSMENT & PLAN NOTE
- Continue Tacrolimus 3/3  - Will need to continue to monitor daily levels of tacrolimus.  - Continue with prednisone 5 mg q D  - Hold on MMF on the setting of recent infection with UTI.   - Once finish treatment for UTI on 11/15/18 then can start back on MMF  - Will evaluate daily for signs and symptoms of immunosuppression toxicity

## 2018-11-02 NOTE — DISCHARGE SUMMARY
"[facility]  Discharge Summary  [facility]    Patient Name: Bhumi Siegel  YOB: 1970    Admit Date: 10/28/2018    Discharge Date and Time: 11/2/2018 @ 1311    Discharge Attending Physician: Yamil Thompson MD     Team: [facility] MED O    Reason for Admission: DKA    Active Hospital Problems    Diagnosis  POA    Pyelonephritis [N12]  Yes     Priority: 2      Graft      Thrombocytopenia [D69.6]  Yes    Normocytic anemia [D64.9]  Yes    Hyperglycemia [R73.9]  Yes    Long-term use of immunosuppressant medication [Z79.899]  Not Applicable    History of kidney transplant [Z94.0]  Not Applicable    Immunosuppression [D89.9]  Yes      Resolved Hospital Problems    Diagnosis Date Resolved POA    *Diabetic ketoacidosis without coma associated with type 2 diabetes mellitus [E11.10] 11/02/2018 Yes     Priority: 1 - High    Hyponatremia [E87.1] 11/02/2018 Yes    DKA (diabetic ketoacidoses) [E13.10] 11/02/2018 Yes    Hypomagnesemia [E83.42] 10/30/2018 Yes    Sepsis [A41.9] 11/02/2018 Yes    Dehydration [E86.0] 10/30/2018 Yes    LISBET (acute kidney injury) [N17.9] 11/02/2018 Yes       HPI:   As per Dr. Dario Black - "48 year old  woman with hypertension, insulin dependent diabetes, history of right kidney cadaver transplant on Cellcept and Prograf, CKD stage 3, history of AV fistula status post reversal, and history of emergent caesarian delivery in 1990s transferred from [facility] for further management of an acute kidney injury with increase in creatinine from 1.7 (baseline) to 6 in the setting of admission and treatment for a presumed UTI. The patient was admitted on 10/26 and treated with Vancomycin and Levaquin at their facility. Her symptoms began on 10/24 and involved general malaise and nausea progressing to fevers and chills with a temperature of 103 at home. Patient received a CT scan at [facility] that made reference to mild perinephric fat " "stranding in the transplanted right kidney and severe atrophy in the left kidney with no signs of obstruction. Patient had an initial WBC count of 18.8 on admission (10/26) that improved to 14 today (10/28) prior to transfer. CBC at their facility also notable for mild thrombocytopenia < 90 for which the patient lacks any bleeding symptoms. Initial urinalysis was negative for nitrites and leukocyte esterase with 3+ protein and 15-20 RBC. Repeat urinalysis today (10/28) showed negative nitrites and negative leukocyte esterase. The patient currently lacks fevers/chills, nausea/vomiting, dysuria, and back pain. She arrived with a alvarado catheter with minimal urine output. Patient also has a history of anxiety for which she takes Lexapro daily."       Hospital Course:   Patient was admitted on 10/24 at OSH for sepsis and LISBET, treated with vancomycin and Levaquin but she clinically worsened. Subsequently transferred here and admitted to ICU for severe sepsis, LISBET and DKA. Patient was started on ceftriaxone, and insulin gtt which clinical improvement. Kidney transplant team on board. Remained hemodynamically stable. Insulin drip transitioned to subcutaneous insulin. Downgraded from ICU to PCU on 10/30 to Hospitalist service. Patient DKA resolved, AGMA resolved. Urine grew E coli ESBL. Tx changed from ceftriaxone to ertapenem. Patient to finish tx for total 14 days. PICC team consulted and line placed. Patient to follow up with PCP in one week. Weekly CBC and CMP needed while on antibiotics.    On discharge day patient appears well, denies any complaints including fevers, chills, dysuria, abdominal pain, N/V/C/D. Ambulating well. Eating well. CVC removed. Safe for d/c.        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    Allograft pyelonephritis  Hypomagnesemia - resolved post " "repletion   Acute Kidney Injury superimposed on CKD   History of kidney transplant on chronic immunosuppressants  Non anion gap Metabolic Acidosis - resolved  Normocytic Anemia  Hyponatremia  Thrombocytopenia  Anxiety     Procedures Performed: PICC line placement    Special Care, Treatment, and Services Provided: PICC line     Consults: Transplant team, Infectious disease, PICC team    Significant Diagnostic Studies:  No results found for: EF  Hemoglobin A1C   Date Value Ref Range Status   10/29/2018 9.5 (H) 4.0 - 5.6 % Final     Comment:     ADA Screening Guidelines:  5.7-6.4%  Consistent with prediabetes  >or=6.5%  Consistent with diabetes  High levels of fetal hemoglobin interfere with the HbA1C  assay. Heterozygous hemoglobin variants (HbS, HgC, etc)do  not significantly interfere with this assay.   However, presence of multiple variants may affect accuracy.       CBC:   Recent Labs   Lab 11/02/18  0454   WBC 7.16   RBC 3.95*   HGB 11.7*   HCT 33.8*   *   MCV 86   MCH 29.6   MCHC 34.6     CMP:   Recent Labs   Lab 10/31/18  0733  11/02/18  0454   *   < > 98   CALCIUM 8.9   < > 9.0   ALBUMIN 2.3*  --   --    PROT 5.9*  --   --    *   < > 137   K 4.3   < > 3.5   CO2 19*   < > 25      < > 103   BUN 82*   < > 58*   CREATININE 4.2*   < > 3.0*   ALKPHOS 84  --   --    ALT 41  --   --    AST 40  --   --    BILITOT 0.5  --   --     < > = values in this interval not displayed.       Final Diagnoses: DKA secondary to acute allograft pyelonephritis     Discharged Condition: good  Face to face services were provided on 11/2/2018   Time Spent:  I spent >30 minutes on the discharge, which included reviewing hospital course with patient/family, reviewing discharge medications, and arranging follow-up care.    Physical Exam on 11/2/2018:  /78 (BP Location: Right arm)   Pulse 64   Temp 98.5 °F (36.9 °C) (Oral)   Resp 20   Ht 5' 2" (1.575 m)   Wt 99.4 kg (219 lb 2.2 oz)   SpO2 96%   " Breastfeeding? No   BMI 40.08 kg/m²   GEN: Obese  female in no acute distress. Nontoxic.  HEENT: NCAT. PERRL. EOMI. Conjunctivae/corneas clear, sclera Anicteric.  CVS: RRR. Normal s1 s2 no murmur, click, rub or gallop  LUNG: CTAB. Normal respiratory effort. No wheezes, rhonchi, or crackles.  ABD: Normoactive BS, soft, NT, ND, no masses or organomegaly.  EXT: No edema. No cyanosis. Full ROM.  SKIN: color, texture, turgor normal. No rashes or lesions.   NEURO: Alert, oriented x 4, Spont mvt of all extremities with no focal deficits noted.      Disposition: Home or Self Care    Follow Up Instructions:   Follow-up Information     Charlotte Anderson MD In 1 week.    Specialty:  Family Medicine  Why:  Patient to go to follow up with PCP within one week for weekly labs  Contact information:  [address] [place] [id number]  [phone]                 No future appointments.    Medications:  Reconciled Home Medications:      Medication List      START taking these medications    ERTAPENEM (INVANZ) 1 G/100 ML NS (READY TO MIX)  Inject 50 mLs (0.5 g total) into the vein once daily. for 13 days     insulin aspart U-100 100 unit/mL Inpn pen  Commonly known as:  NovoLOG  Inject 6 Units into the skin 3 (three) times daily.     insulin detemir U-100 100 unit/mL (3 mL) Inpn pen  Commonly known as:  LEVEMIR FLEXTOUCH  Inject 35 Units into the skin once daily.     sodium bicarbonate 650 MG tablet  Take 2 tablets (1,300 mg total) by mouth 3 (three) times daily.        CHANGE how you take these medications    escitalopram oxalate 10 MG tablet  Commonly known as:  LEXAPRO  Take 1 tablet (10 mg total) by mouth once daily.  What changed:    · medication strength  · how much to take     * tacrolimus 1 MG Cap  Commonly known as:  PROGRAF  Take 3 capsules (3 mg total) by mouth once daily.  What changed:  when to take this     * tacrolimus 1 MG Cap  Commonly known as:  PROGRAF  Take  4 capsules (4 mg total) by mouth once daily.  What changed:  You were already taking a medication with the same name, and this prescription was added. Make sure you understand how and when to take each.         * This list has 2 medication(s) that are the same as other medications prescribed for you. Read the directions carefully, and ask your doctor or other care provider to review them with you.            CONTINUE taking these medications    aspirin 81 MG Chew  Take 81 mg by mouth once daily.     predniSONE 5 MG tablet  Commonly known as:  DELTASONE  Take 5 mg by mouth once daily.     REMERON ORAL  Take by mouth.        STOP taking these medications    METOPROLOL TARTRATE ORAL     mycophenolate 500 mg Tab  Commonly known as:  CELLCEPT          Current Discharge Medication List      START taking these medications    Details   ertapenem sodium (ERTAPENEM, INVANZ, 1 G/100 ML NS, READY TO MIX,) Inject 50 mLs (0.5 g total) into the vein once daily. for 13 days      insulin aspart U-100 (NOVOLOG) 100 unit/mL InPn pen Inject 6 Units into the skin 3 (three) times daily.  Qty: 16.2 mL, Refills: 3      insulin detemir U-100 (LEVEMIR FLEXTOUCH) 100 unit/mL (3 mL) SubQ InPn pen Inject 35 Units into the skin once daily.  Qty: 31.5 mL, Refills: 3      sodium bicarbonate 650 MG tablet Take 2 tablets (1,300 mg total) by mouth 3 (three) times daily.  Qty: 180 tablet, Refills: 11         CONTINUE these medications which have CHANGED    Details   escitalopram oxalate (LEXAPRO) 10 MG tablet Take 1 tablet (10 mg total) by mouth once daily.  Qty: 30 tablet, Refills: 11      !! tacrolimus (PROGRAF) 1 MG Cap Take 3 capsules (3 mg total) by mouth once daily.  Qty: 90 capsule, Refills: 11      !! tacrolimus (PROGRAF) 1 MG Cap Take 4 capsules (4 mg total) by mouth once daily.  Qty: 120 capsule, Refills: 11       !! - Potential duplicate medications found. Please discuss with provider.      CONTINUE these medications which have NOT CHANGED     Details   aspirin 81 MG Chew Take 81 mg by mouth once daily.      mirtazapine (REMERON ORAL) Take by mouth.      predniSONE (DELTASONE) 5 MG tablet Take 5 mg by mouth once daily.         STOP taking these medications       METOPROLOL TARTRATE ORAL Comments:   Reason for Stopping:         mycophenolate (CELLCEPT) 500 mg Tab Comments:   Reason for Stopping:               Discharge Instructions:  - Finish prescribed meds   - Total 14 days of abx  - Follow up with PCP outpatient (patient lives 4 hrs away)  - Weekly CBC and CMP on abx  - Notice medication changes (insulin and sodium bicarb, mycophenolate, metoprolol)  - Hold cellcept due to active infection, resume after transplant outpatient clinic follow up      Discharge Procedure Orders   COMPREHENSIVE METABOLIC PANEL   Standing Status: Future Standing Exp. Date: 01/01/20     CBC W/ AUTO DIFFERENTIAL   Standing Status: Future Standing Exp. Date: 01/01/20       Yamil Thompson MD  Department of Hospital Medicine

## 2018-11-02 NOTE — ASSESSMENT & PLAN NOTE
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

## 2018-11-02 NOTE — ASSESSMENT & PLAN NOTE
- LISBET superimposed on CKD which has been secondary to pre- renal vs iATN secondary do decreased perfusion due to sepsis and hypotension.   - Serum creatinine continues on downward trend from 3.6 ---> 3 today which close to her baseline   - Acid/base: Continue with sodium bicarbonate 650 mg q BID    - Avoid nephrotoxic medications   - Continue to monitor intake and output

## 2018-11-02 NOTE — PLAN OF CARE
Plan for d/c in progress, pt has to await new dose to be delivered prior to d/c.  No additional teaching, once received pt can d/c.

## 2018-11-05 NOTE — PHYSICIAN QUERY
"PT Name: Bhumi Siegel  MR #: [id number]    Physician Query Form - Hematology Clarification      CDS: Joelle Licona RN, CCDS         Contact information :ext [phone]  [email or web address]       This form is a permanent document in the medical record.                Query withdrawn. CK    Query Date: November 5, 2018    By submitting this query, we are merely seeking further clarification of documentation. Please utilize your independent clinical judgment when addressing the question(s) below.    The Medical record contains the following:   Indicators  Supporting Clinical Findings Location in Medical Record   x "Anemia" documented "Normocytic anemia" Progress note 11/2/18   x H & H = H/H 12.4/36.2  H/H 11.7/33.8 Lab 10/28/18  Lab 11/2/18    BP =                     HR=      "GI bleeding" documented      Acute bleeding (Non GI site)      Transfusion(s)      Treatment:     x Other:  "with hypertension, insulin dependent diabetes, history of right kidney cadaver transplant on Cellcept and Prograf, CKD stage 3"   H&P 10/28/18     Provider, please specify diagnosis or diagnoses associated with above clinical findings.       Anemia of chronic disease ( Specify chronic disease)        CKD (specify stage):    Other (Specify):    Clinically Undetermined        Other Hematological Diagnosis (please specify):      Clinically Undetermined       Please document in your progress notes daily for the duration of treatment, until resolved, and include in your discharge summary.                                                                                                      "

## 2018-11-05 NOTE — PHYSICIAN QUERY
"PT Name: Bhumi Siegel  MR #: [id number]     Physician Query Form - Documentation Clarification      CDS: Joelle Licona RN, CCDS         Contact information :ext [phone])  [email or web address]       This form is a permanent document in the medical record.  Query withdrawn, not needed- Per DS "DKA secondary to acute allograft pyelonephritis"        Query Date: November 5, 2018    By submitting this query, we are merely seeking further clarification of documentation. Please utilize your independent clinical judgment when addressing the question(s) below.    The Medical record reflects the following:    Supporting Clinical Findings Location in Medical Record     "transferred from [facility] for further management of an acute kidney injury with increase in creatinine from 1.7 (baseline) to 6 in the setting of admission and treatment for a presumed UTI. The patient was admitted on 10/26 and treated with Vancomycin and Levaquin at their facility."   "Patient received a CT scan at [facility] that made reference to mild perinephric fat stranding in the transplanted right kidney and severe atrophy in the left kidney with no signs of obstruction."      "Allograft pyelonephritis"  "Urine grew E coli ESBL. Tx changed from ceftriaxone to ertapenem. Patient to finish tx for total 14 days. PICC team consulted and line placed. Patient to follow up with PCP in one week. Weekly CBC and CMP needed while on antibiotics."     H&P 10/29/18                        Discharge summary 11/2/18                                                                                Doctor, Please specify diagnosis or diagnoses associated with above clinical findings.    Provider Use Only      [  ] Acute Allograft pyelonephritis    [  ] Chronic Allograft  pyelonephritis    [  ] Acute and on chronic Allograft pyelonephritis    [  ] Other clarification, _____________                                                                 "                                              Clinically Undetermined

## 2018-11-06 LAB — BACTERIA BLD CULT: NORMAL

## 2018-11-07 ENCOUNTER — TELEPHONE (OUTPATIENT)
Dept: TRANSPLANT | Facility: CLINIC | Age: 48
End: 2018-11-07

## 2018-11-08 ENCOUNTER — TELEPHONE (OUTPATIENT)
Dept: TRANSPLANT | Facility: CLINIC | Age: 48
End: 2018-11-08

## 2018-11-08 NOTE — TELEPHONE ENCOUNTER
I received note from MD and I Called pt.  She is in her local [place] office now.  I faxed records per pt request.

## 2018-11-08 NOTE — TELEPHONE ENCOUNTER
"Chart reviewed, while cros coverign for Dr. Nunes. She was seen recently by KTM in early November 2018 when transferred here acutely ill.    Angelica Siegel is a 48 year old woman with past medical history of  ESRD secondary to uncontrolled blood pressures after pregnancy, which was on HD for 5 years (BRADEN AVF which has been legated), DBD kidney transplant on 09/5/2003 on [facility]  with CKD stage III with a serum creatinine baseline of 1.9-2.9, on immunosuppression with tacrolimus 4 mg BID,   mg BID and prednisone 5 mg q D. She arrived to [facility] as a transferred from [facility] for further management of an acute kidney injury which increased from baseline to 6 in the setting of admission and treatment for a presumed UTI. The patient was admitted on 10/26/18 and treated with Vancomycin and Levaquin at their facility. Her symptoms began on 10/24/18 which involved general malaise and nausea progressing to fevers and chills with a temperature of 103 at home. Patient received a CT scan at [facility] that made reference to mild perinephric fat stranding in the transplanted right kidney and severe atrophy in the left kidney with no signs of obstruction. Has been presenting with uncontrolled blood glucose which is treated for DKA, with insuline and bicarbonate ggt. KTM was consulted for management of immunosuppression in the setting of LISBET, Sepsis secondary to UTI.     Per Dr. Nunes's last  hospital note, "Advised her to follow-up with her general nephrologist and to establish with a transplant center which she preferred to do closer to home. "    D/C summary: "Hospital Course: Patient was admitted on 10/24 at OSH for sepsis and LISBET, treated with vancomycin and Levaquin but she clinically worsened. Subsequently transferred here and admitted to ICU for severe sepsis, LISBET and DKA. Patient was started on ceftriaxone, and insulin gtt which clinical improvement. Kidney " "transplant team on board. Remained hemodynamically stable. Insulin drip transitioned to subcutaneous insulin. Downgraded from ICU to PCU on 10/30 to Hospitalist service. Patient DKA resolved, AGMA resolved. Urine grew E coli ESBL. Tx changed from ceftriaxone to ertapenem. Patient to finish tx for total 14 days. PICC team consulted and line placed. Patient to follow up with PCP in one week. Weekly CBC and CMP needed while on antibiotics."    Per d/c summary, she was directed to f/u with her PCP.Charlotte Oconnell within a week post d/c.    REC--> please call patient and clarify what she needs, and remind her of d/c plan (to f/u locally). I cannot see she had labs post d/c to review either. Also remind her to f/u with her local kidney doctor, and how she can request her [facility] records be sent to her local providers. Thank you.  "

## 2018-11-08 NOTE — TELEPHONE ENCOUNTER
----- Message from Marysol Zapien RN sent at 11/7/2018  4:38 PM CST -----  Contact: Pt  We do not follow this Pt. Unsure who ordered anything on Pt. Thanks Marysol  ----- Message -----  From: Izabella Aviles  Sent: 11/7/2018   3:10 PM  To: Marysol Zapien RN, Lilli Nunes MD    Don't know what to do with this patient. We have never seen them, but looks like they have had a transplant.  ----- Message -----  From: Fortino Razo  Sent: 11/7/2018  11:56 AM  To: [facility] Staff    Pt. is calling for lab results.     Please call pt at [phone].    Thanks.

## 2021-09-01 NOTE — SUBJECTIVE & OBJECTIVE
Subjective:   History of Present Illness:  Angelica Siegel is a 48 year old woman with past medical history of  ESRD secondary to uncontrolled blood pressures after pregnancy, which was on HD for 5 years (BRADEN AVF which has been legated), DBD kidney transplant on 09/5/2003 on [facility]  with CKD stage III with a serum creatinine baseline of 1.4-1.5, on immunosuppression with tacrolimus 4 mg BID,   mg BID and prednisone 5 mg q D. She arrived to [facility] as a transferred from [facility] for further management of an acute kidney injury which increased from baseline to 6 in the setting of admission and treatment for a presumed UTI. The patient was admitted on 10/26/18 and treated with Vancomycin and Levaquin at their facility. Her symptoms began on 10/24/18 which involved general malaise and nausea progressing to fevers and chills with a temperature of 103 at home. Patient received a CT scan at [facility] that made reference to mild perinephric fat stranding in the transplanted right kidney and severe atrophy in the left kidney with no signs of obstruction. Has been presenting with uncontrolled blood glucose which is treated for DKA, with insuline and bicarbonate ggt. KTM was consulted for management of immunosuppression in the setting of LISBET, Sepsis secondary to UTI.     Ms. Siegel is a 48 y.o. year old female who is status post .    Her maintenance immunosuppression consists of:   Immunosuppressants (From admission, onward)    Start     Stop Route Frequency Ordered    11/01/18 0800  tacrolimus capsule 4 mg      -- Oral Daily 10/31/18 1348    10/31/18 1800  tacrolimus capsule 3 mg      -- Oral Daily 10/31/18 1348          Interval History:  Patient evaluated at bedside, no significant event overnight. Denies any fever, nausea, vomiting or diarrhea.     Past Medical, Surgical, Family, and Social History:   Unchanged from H&P.    Scheduled Meds:   cefTRIAXone  HEMATOLOGY ONCOLOGY PROGRESS NOTE     CONSULTING PHYSICIAN:  Yamil Valentine MD  DATE OF SERVICE: 09/01/21     REASON FOR CONSULT: breast cancer    STAGING:   Cancer Staging Summary for Sara Wright     Malignant neoplasm of upper-outer quadrant of left breast in female, estrogen receptor positive (CMS/HCC)     Stage Date Classification Stage Status    2/11/21 Clinical Stage IB (cT2, cN0, cM0, G2, ER+, MN+, HER2-) Signed by Yamil Valentine MD on 3/20/21    4/13/21 Pathologic Stage Unknown (pT2, pNX, cM0, G2, ER+, MN+, HER2-) Signed by Yamil Valentine MD on 4/24/21                 HISTORY OF PRESENT ILLNESS:   Sara Wright is a 78 year old female patient with left breast cancer. Sara was previously diagnosed with the left breast cancer in October 1997. She had a 1.7 cm primary tumor with 17 negative axillary lymph nodes.  She had a grade III tumor, ER/MN negative. She received chemotherapy on Cytoxan, methotrexate and 5-FU for six cycles under the care of Dr. Kodi Cordova and subsequently completed adjuvant radiation therapy. She had been doing well without evidence of recurrent disease.  Sara noted increased pain in her left breast and the left axilla in 2014 and was seen by Dr. Young. In the meantime, she also felt that her breast has become more firm on the left. She did have a mammogram and ultrasound performed in [facility], but there was no evidence of malignancy and no biopsy was performed. She stopped seeing Dr. Young in 2017 due to personal choice. At her annual visit with her PCP in February 2021, Dr. Diaz noticed her left breast felt denser.  Diagnostic mammogram on February 10, 2021 revealed multiple calcifications with an irregular mass in the left breast at 1:00 middle depth.  Ultrasound revealed a 2.3 cm irregular mass in the left breast at 1:00 middle depth, 5 cm from the nipple. She underwent a biopsy on February 11, 2021. Pathology revealed a grade 2 invasive ductal carcinoma which was ER  "(ROCEPHIN) IVPB  1 g Intravenous Q24H    escitalopram oxalate  10 mg Oral Daily    heparin (porcine)  5,000 Units Subcutaneous Q12H    insulin aspart U-100  6 Units Subcutaneous TIDWM    insulin detemir U-100  35 Units Subcutaneous Daily    magnesium sulfate IVPB  3 g Intravenous Once    predniSONE  5 mg Oral Daily    sodium bicarbonate  1,300 mg Oral TID    tacrolimus  3 mg Oral Daily    tacrolimus  4 mg Oral Daily     Continuous Infusions:  PRN Meds:acetaminophen, dextrose 50%, glucagon (human recombinant), glucose, glucose, insulin aspart U-100, ondansetron, sodium chloride 0.9%    Intake/Output - Last 3 Shifts       10/30 0700 - 10/31 0659 10/31 0700 - 11/01 0659 11/01 0700 - 11/02 0659    P.O. 1100 1060     I.V. (mL/kg) 3.9 (0)      Total Intake(mL/kg) 1103.9 (11.1) 1060 (10.7)     Urine (mL/kg/hr) 3040 (1.3) 2740 (1.1)     Stool 0      Total Output 3040 2740     Net -1936.1 -1680            Urine Occurrence  4 x     Stool Occurrence 1 x             Review of Systems   Objective:     Vital Signs (Most Recent):  Temp: 98.7 °F (37.1 °C) (11/01/18 0755)  Pulse: 80 (11/01/18 0755)  Resp: (!) 22 (11/01/18 0755)  BP: 116/72 (11/01/18 0755)  SpO2: (!) 94 % (11/01/18 0755) Vital Signs (24h Range):  Temp:  [97.5 °F (36.4 °C)-98.7 °F (37.1 °C)] 98.7 °F (37.1 °C)  Pulse:  [66-80] 80  Resp:  [12-22] 22  SpO2:  [93 %-98 %] 94 %  BP: (116-148)/(72-94) 116/72     Weight: 99.4 kg (219 lb 2.2 oz)  Height: 5' 2" (157.5 cm)  Body mass index is 40.08 kg/m².    Physical Exam   Constitutional: She appears well-developed. No distress.   HENT:   Head: Normocephalic and atraumatic.   Eyes: Conjunctivae and EOM are normal.   Neck: Normal range of motion. Neck supple.   Cardiovascular: Normal rate, regular rhythm, normal heart sounds and intact distal pulses.   Pulmonary/Chest: No respiratory distress. She has no wheezes.   Abdominal: Soft. Bowel sounds are normal. She exhibits no distension. There is no tenderness. " 91 to 100% strong, CO 51 to 60% moderate, HER-2 1+ by immunohistochemistry, HER-2 nonamplified by FISH, with a Ki-67 of 30%.  She was evaluated by Dr. Birch.  MammaPrint was sent which revealed a low risk luminal A. She started anastrozole on 03/02/2021. Her bilateral mastectomy surgery was on 04/13/2021. Left breast mastectomy revealed a grade 2 invasive ductal carcinoma with ductal carcinoma in situ.  She had disease at 1:00, 11:00 and 12:00.  The largest tumor was 4 cm.  The additional tumors measured 2.6 and 2 cm. She was staged as T2Nx. She stopped anastrozole on 07/08/2021 secondary to dizziness. She restarted on 07/26/2021. She had a bilateral breast revision on 7/30/2021. Pathology showed benign skin and fibroadipose tissue.  I stopped the anastrozole on July 29, 2021.    She underwent revision of bilateral reconstructed breasts on July 30, 2021. She started letrozole in the middle of August 2021. She is tolerated the letrozole better.  Her only complaints are nail thinning and hot flashes. She is on PCN for dental infection.     HISTORIES:    Oncology History  Oncology History   Malignant neoplasm of upper-outer quadrant of left breast in female, estrogen receptor positive (CMS/HCC)   2/11/2021 -  Cancer Staged    Staging form: Breast, AJCC 8th Edition  - Clinical stage from 2/11/2021: Stage IB (cT2, cN0, cM0, G2, ER+, CO+, HER2-) - Signed by Yamil Valentine MD on 3/20/2021       2/21/2021 Initial Diagnosis    Malignant neoplasm of upper-outer quadrant of left breast in female, estrogen receptor positive (CMS/HCC)     4/13/2021 -  Cancer Staged    Staging form: Breast, AJCC 8th Edition  - Pathologic stage from 4/13/2021: Stage Unknown (pT2, pNX, cM0, G2, ER+, CO+, HER2-) - Signed by Yamil Valentine MD on 4/24/2021            Past Medical History  Past Medical History:   Diagnosis Date   • Acute myocardial infarction (CMS/HCC)     many years ago.   • Cholelithiases    • Chronic migraine without aura,    Musculoskeletal: Normal range of motion. She exhibits no edema or tenderness.   Neurological: No cranial nerve deficit.   Skin: Skin is warm and dry. She is not diaphoretic. No erythema.   Nursing note and vitals reviewed.      Laboratory:  CBC:   Recent Labs   Lab 10/30/18  0422 10/31/18  0413 11/01/18 0447   WBC 10.13 7.04 6.99   RBC 4.17 3.40* 3.94*   HGB 12.3 10.2* 11.8*   HCT 35.3* 28.6* 34.1*   * 105* 120*   MCV 85 84 87   MCH 29.5 30.0 29.9   MCHC 34.8 35.7 34.6     BMP:   Recent Labs   Lab 10/31/18  0733 10/31/18  0737 11/01/18 0447   * 215* 143*   * 132* 134*   K 4.3 4.3 3.9    103 103   CO2 19* 20* 23   BUN 82* 78* 71*   CREATININE 4.2* 4.3* 3.6*   CALCIUM 8.9 9.1 9.0     CMP:   Recent Labs   Lab 10/28/18  2300  10/31/18  0733 10/31/18  0737 11/01/18 0447   *   < > 212* 215* 143*   CALCIUM 8.2*   < > 8.9 9.1 9.0   ALBUMIN 2.3*  --  2.3*  --   --    PROT 6.1  --  5.9*  --   --    *   < > 132* 132* 134*   K 4.5   < > 4.3 4.3 3.9   CO2 12*   < > 19* 20* 23      < > 104 103 103   BUN 93*   < > 82* 78* 71*   CREATININE 5.8*   < > 4.2* 4.3* 3.6*   ALKPHOS 78  --  84  --   --    ALT 49*  --  41  --   --    AST 52*  --  40  --   --     < > = values in this interval not displayed.     ABGs:   Recent Labs   Lab 10/28/18  2317   PH 7.277*   PCO2 23.4*   HCO3 10.9*   POCSATURATED 95   BE -16        with intractable migraine, so stated, with status migrainosus    • Essential (primary) hypertension    • Fracture, humerus closed    • Gastritis    • High cholesterol    • Infection and inflammatory reaction due to other internal joint prosthesis, initial encounter (CMS/[facility]) 10/31/2018   • Leg swelling    • Obesity    • Personal history of malignant neoplasm of breast    • PONV (postoperative nausea and vomiting)     Post op n/v   • Sleep apnea     no cpap    • TIA (transient ischemic attack)        Past Surgical History  Past Surgical History:   Procedure Laterality Date   • Adenoidectomy     • Anesth,shoulder replacement Left     had to do a redo, which got infected.  Multiple x 6    • Breast biopsy Left     2/2021   • Breast lumpectomy Left     23 years ago   • Breast surgery Bilateral     mastectomy lymph node dissection   • Joint replacement      shoulder   • Removal of ovary(s)      and fallopian tubes   • Tonsillectomy         Family History  Family History   Problem Relation Age of Onset   • Cancer, Endometrial Mother    • Coronary Artery Disease Father        Social History  Social History     Tobacco Use   • Smoking status: Never Smoker   • Smokeless tobacco: Never Used   Substance Use Topics   • Alcohol use: No       Medications   Current Outpatient Medications   Medication Sig Dispense Refill   • letrozole (FEMARA) 2.5 MG tablet Take 1 tablet by mouth daily. 30 tablet 3   • omeprazole (PriLOSEC OTC) 20 MG tablet Take 1 tablet by mouth.     • acetaminophen (TYLENOL) 325 MG tablet Take 2 tablets by mouth every 6 hours as needed for Pain. TAKE 2 TABLET Every twelve hours PRN for pain     • triamcinolone (ARISTOCORT) 0.1 % cream Apply topically 2 times daily. 15 g 1   • omeprazole (PrilOSEC) 20 MG capsule Take 1 tab by mouth daily. 90 capsule 3   • pravastatin (PRAVACHOL) 20 MG tablet Take 1 tablet by mouth daily. 90 tablet 3   • vitamin B-12 (CYANOCOBALAMIN) 50 MCG tablet Take 50 mcg by mouth daily.     •  loratadine (CLARITIN) 10 MG tablet Take 10 mg by mouth daily. Pt takes 2 daily     • Multiple Vitamins-Minerals (RA VISION-MARITZA PRESERVE PO)      • Biotin 5000 MCG Cap Take 5,000 mcg by mouth daily. 30 capsule 0   • metoPROLOL succinate (TOPROL-XL) 25 MG 24 hr tablet TAKE 1 TABLET BY MOUTH ONE TIME A DAY  90 tablet 0   • Multiple Vitamin (MULTI-VITAMIN) tablet Take by mouth daily.     • flecainide (TAMBOCOR) 50 MG tablet Take by mouth every 12 hours.     • vitamin E 1000 units capsule Take 1,000 Units by mouth daily.     • cycloSPORINE (RESTASIS) 0.05 % ophthalmic emulsion Place 1 drop into both eyes as needed.      • aspirin 81 MG tablet Take 81 mg by mouth daily.        No current facility-administered medications for this visit.        Allergies  ALLERGIES:   Allergen Reactions   • Ceftriaxone HIVES        REVIEW OF SYSTEMS:    Constitutional: + fatigue. 4  pound weight loss , no fever, no chills, + night sweats.  Eyes:  + wears glasses, + blurry vision  ENT/mouth: no tinnitus, normal hearing, no epistaxis, no hoarseness, no oral ulcers, no gingival bleeding, no sore throat, no postnasal drip, no nasal drip, no mouth pain, no sinus pain  Cardiovascular: no chest pain, no palpitations, no syncope, no upper extremity edema, no lower extremity edema, no calf discomfort.  Respiratory: no cough, no hemoptysis, + dyspnea (with exertion), no pleurisy, no wheezing.  Gastrointestinal: no abdominal pain, no nausea, no vomiting, no constipation, no hematochezia, no jaundice, no abdominal cramping, no hematemesis, no diarrhea, no melena, no dyspepsia, no dysphagia.  Musculoskeletal: + left shoulder pain, + joint aches (less)  Skin: no rash, no nodules, no pruritus, no lesions, + hair thinning, + nail changes  Neurologic: no confusion, no seizures, no syncope, no tremor, no speech change, no headache, no hiccups, no abnormal gait, no weakness, no sensory changes, + dizziness.  Psychiatric: + mood swings  Endocrine: no  polyuria, no polydipsia, no thyroid disease symptoms, + hot flashes.  Lymphatic: no lymphadenopathy, no lymphedema.    OBJECTIVE:      Vital Signs  There were no vitals filed for this visit.   ECOG   ECOG Performance Status         Physical Examination  General: awake, alert oriented, NAD, appears staged age  Skin: skin color, texture, and turgor normal.  Head: normocephalic, without obvious abnormality  Eyes: conjunctiva clear bilaterally, no scleral icterus   Ears/Nose/Throat: moist mucus membranes   Neck: supple  Lungs: clear to auscultation  Heart: regular rate and rhythm. S1 and S2 normal. No murmur/rubs/gallops.   Abdomen: soft, non-tender, non-distended, bowel sounds normal.  No organomegaly.  Lymph Nodes: no adenopathy.  Musculoskeletal: symmetrical strength and tone.  Neurologic:  awake, alert, oriented x 3, no focal deficits.       ASSESSMENT/PLAN:  78 year old lady with recurrent breast cancer presents for follow-up    1. Left breast cancer: She was diagnosed with a left breast cancer in 1997.  This was node negative.  She was treated with lumpectomy, 6 cycles of CMF chemotherapy, and radiation therapy.  At that time she was estrogen/progesterine negative.  She was doing well up until 2021.  She underwent diagnostic mammogram and ultrasound on February 10, 2021 which revealed a mass in the left upper outer quadrant.  She underwent biopsy on February 11, 2021 which revealed a grade 2, invasive ductal carcinoma which was ER/MS positive and HER-2 negative.  MammaPrint was sent which revealed a low risk luminal A cancer,  MRI breast on February 22, 2021 revealed a 2.4 cm irregular mass in left breast at 1:00 anterior depth with a 1.3 cm oval lesion in the left breast at 1:00 anterior depth.  Hereditary gene testing revealed a BRCA1 mutation.  Her case was discussed at tumor board. I started her on anastrozole on 03/02/2021 as we were waiting for surgical planning. Her bilateral mastectomy surgery was on  04/13/2021 with reconstruction using latissimus flaps.  Right breast mastectomy was negative for carcinoma.  Left breast mastectomy revealed a grade 2 invasive ductal carcinoma with ductal carcinoma in situ.  She had disease at 1:00, 11:00 and 12:00.  The largest tumor was 4 cm.  The additional tumors measured 2.6 and 2 cm. She was staged as T2Nx.  She did not require any further radiation as she received radiation therapy 1997. She stopped anastrozole on 07/08/2021 secondary to dizziness. She restarted on 07/26 but is having tough time handling this.  She had a bilateral breast revision on 7/30/2021. Pathology showed benign skin and fibroadipose tissue. I stopped the anastrozole on July 29, 2021.  She started letrozole in the middle of August 2021.  We will continue as planned.     2. BRCA1: Her children were tested (2 daughters were negative). She underwent BSO on 04/13/2021.  Pathology was negative for any malignancy.    3. Toxicities  A. Fatigue: Secondary to endocrine therapy  B. Hair thinning: She is on vitamins   C. Left shoulder pain: This started prior to starting anastrozole. She is followed by [facility]. Hot flashes: secondary to letrozole . See above  E. Nail thinning; She is using hair, skin, nail vitamin as well as polish.     4. Other: She can proceed with dental work from my perspective.  She sees Dr. Trejo in [place].      All of the patient's questions were answered to their satisfaction. They will return to clinic as above.  -----------------------------------------------------------------------------------------------------------------------  Charting performed by Joselo COE for Dr. Valentine  The documentation recorded by the joselo accurately reflects the service I personally performed and the decisions made by vianca Valentine MD

## 2022-07-26 DIAGNOSIS — N18.5 CHRONIC KIDNEY DISEASE, STAGE V: Primary | ICD-10-CM

## 2022-07-26 DIAGNOSIS — Z01.818 OTHER SPECIFIED PRE-OPERATIVE EXAMINATION: ICD-10-CM

## 2022-08-01 ENCOUNTER — OFFICE VISIT (OUTPATIENT)
Dept: SURGERY | Facility: CLINIC | Age: 52
End: 2022-08-01
Payer: MEDICAID

## 2022-08-01 DIAGNOSIS — N18.5 CHRONIC KIDNEY DISEASE, STAGE V: Primary | ICD-10-CM

## 2022-08-01 DIAGNOSIS — Z01.818 OTHER SPECIFIED PRE-OPERATIVE EXAMINATION: ICD-10-CM

## 2022-08-01 LAB
ANION GAP SERPL CALC-SCNC: 9 MMOL/L (ref 3–11)
BASOPHILS NFR BLD: 0.4 % (ref 0–3)
BUN SERPL-MCNC: 55 MG/DL (ref 7–18)
BUN/CREAT SERPL: 13.92 RATIO (ref 7–18)
CALCIUM SERPL-MCNC: 9.4 MG/DL (ref 8.8–10.5)
CHLORIDE SERPL-SCNC: 107 MMOL/L (ref 100–108)
CO2 SERPL-SCNC: 23 MMOL/L (ref 21–32)
CREAT SERPL-MCNC: 3.95 MG/DL (ref 0.55–1.02)
EOSINOPHIL NFR BLD: 2 % (ref 1–3)
ERYTHROCYTE [DISTWIDTH] IN BLOOD BY AUTOMATED COUNT: 12.1 % (ref 12.5–18)
GFR ESTIMATION: 12
GLUCOSE SERPL-MCNC: 146 MG/DL (ref 70–110)
HCT VFR BLD AUTO: 38.1 % (ref 37–47)
HGB BLD-MCNC: 13.5 G/DL (ref 12–16)
LYMPHOCYTES NFR BLD: 24.8 % (ref 25–40)
MCH RBC QN AUTO: 30.8 PG (ref 27–31.2)
MCHC RBC AUTO-ENTMCNC: 35.4 G/DL (ref 31.8–35.4)
MCV RBC AUTO: 86.8 FL (ref 80–97)
MONOCYTES NFR BLD: 5.5 % (ref 1–15)
NEUTROPHILS # BLD AUTO: 6.12 10*3/UL (ref 1.8–7.7)
NEUTROPHILS NFR BLD: 66.8 % (ref 37–80)
NUCLEATED RED BLOOD CELLS: 0 %
PLATELETS: 177 10*3/UL (ref 142–424)
POTASSIUM SERPL-SCNC: 4.3 MMOL/L (ref 3.6–5.2)
RBC # BLD AUTO: 4.39 10*6/UL (ref 4.2–5.4)
SODIUM BLD-SCNC: 139 MMOL/L (ref 135–145)
WBC # BLD: 9.2 10*3/UL (ref 4.6–10.2)

## 2022-08-01 PROCEDURE — 1160F PR REVIEW ALL MEDS BY PRESCRIBER/CLIN PHARMACIST DOCUMENTED: ICD-10-PCS | Mod: CPTII,S$GLB,, | Performed by: SURGERY

## 2022-08-01 PROCEDURE — 99203 OFFICE O/P NEW LOW 30 MIN: CPT | Mod: S$GLB,,, | Performed by: SURGERY

## 2022-08-01 PROCEDURE — 1159F MED LIST DOCD IN RCRD: CPT | Mod: CPTII,S$GLB,, | Performed by: SURGERY

## 2022-08-01 PROCEDURE — 1159F PR MEDICATION LIST DOCUMENTED IN MEDICAL RECORD: ICD-10-PCS | Mod: CPTII,S$GLB,, | Performed by: SURGERY

## 2022-08-01 PROCEDURE — 1160F RVW MEDS BY RX/DR IN RCRD: CPT | Mod: CPTII,S$GLB,, | Performed by: SURGERY

## 2022-08-01 PROCEDURE — 99203 PR OFFICE/OUTPT VISIT, NEW, LEVL III, 30-44 MIN: ICD-10-PCS | Mod: S$GLB,,, | Performed by: SURGERY

## 2022-08-01 RX ORDER — OLANZAPINE 2.5 MG/1
2.5 TABLET ORAL NIGHTLY
COMMUNITY
Start: 2022-04-12

## 2022-08-01 RX ORDER — INSULIN ASPART 100 [IU]/ML
INJECTION, SOLUTION INTRAVENOUS; SUBCUTANEOUS
COMMUNITY

## 2022-08-01 RX ORDER — LABETALOL 200 MG/1
200 TABLET, FILM COATED ORAL 2 TIMES DAILY
COMMUNITY
Start: 2022-05-02 | End: 2023-07-06

## 2022-08-01 RX ORDER — ATORVASTATIN CALCIUM 20 MG/1
TABLET, FILM COATED ORAL
COMMUNITY
Start: 2022-07-19

## 2022-08-01 RX ORDER — GABAPENTIN 400 MG/1
400 CAPSULE ORAL NIGHTLY
COMMUNITY
Start: 2022-04-12 | End: 2023-07-06

## 2022-08-01 RX ORDER — HYDROXYZINE HYDROCHLORIDE 10 MG/1
10 TABLET, FILM COATED ORAL NIGHTLY
COMMUNITY
Start: 2022-07-22

## 2022-08-01 RX ORDER — FUROSEMIDE 20 MG/1
20 TABLET ORAL DAILY
COMMUNITY
Start: 2022-07-22

## 2022-08-01 RX ORDER — INSULIN GLARGINE 100 [IU]/ML
INJECTION, SOLUTION SUBCUTANEOUS
COMMUNITY
End: 2023-07-06

## 2022-08-01 NOTE — PROGRESS NOTES
History & Physical    SUBJECTIVE:     History of Present Illness:    52-year-old female referred by Dr. Be Beltran for tunneled peritoneal dialysis catheter insertion.  Patient has longstanding hypertension and this is caused her renal failure in the past and she has been on hemodialysis in the past.  She got a kidney transplant however this is starting to fail in her kidney function is down 13%.  She will need to start dialysis soon and I have been asked to place a tunneled peritoneal dialysis catheter and leave it externalized.  She is not currently on dialysis.  Patient has had 2 previous C-sections 1 through a lower midline abdominal incision.  She also has a history of diverticulitis in the past but no prior surgery for this    Chief Complaint   Patient presents with    Other     PD cath placement          Review of patient's allergies indicates:  Review of patient's allergies indicates:   Allergen Reactions    Iron analogues Anaphylaxis    Morphine Other (See Comments)     Burns all over once IVP    Pcn [penicillins] Rash       Current Outpatient Medications on File Prior to Visit   Medication Sig Dispense Refill    atorvastatin (LIPITOR) 20 MG tablet       escitalopram oxalate (LEXAPRO) 10 MG tablet Take 1 tablet (10 mg total) by mouth once daily. 30 tablet 11    furosemide (LASIX) 20 MG tablet Take 20 mg by mouth once daily.      gabapentin (NEURONTIN) 400 MG capsule Take 400 mg by mouth every evening.      hydrOXYzine HCL (ATARAX) 10 MG Tab Take 10 mg by mouth every evening.      insulin aspart U-100 (NOVOLOG U-100 INSULIN ASPART) 100 unit/mL injection Novolog U-100 Insulin aspart 100 unit/mL subcutaneous solution   Inject 8 units 3 times a day by subcutaneous route.      insulin detemir U-100 (LEVEMIR FLEXTOUCH) 100 unit/mL (3 mL) SubQ InPn pen Inject 35 Units into the skin once daily. 31.5 mL 3    insulin glargine (LANTUS U-100 INSULIN) 100 unit/mL injection Lantus U-100 Insulin 100 unit/mL  subcutaneous solution   Inject 25 units every day by subcutaneous route at bedtime.      labetaloL (NORMODYNE) 200 MG tablet Take 200 mg by mouth 2 (two) times daily.      OLANZapine (ZYPREXA) 2.5 MG tablet Take 2.5 mg by mouth every evening.      predniSONE (DELTASONE) 5 MG tablet Take 5 mg by mouth once daily.      sodium bicarbonate 650 MG tablet Take 2 tablets (1,300 mg total) by mouth 3 (three) times daily. 180 tablet 11    tacrolimus (PROGRAF) 1 MG Cap Take 4 capsules (4 mg total) by mouth once daily. 120 capsule 11    [DISCONTINUED] aspirin 81 MG Chew Take 81 mg by mouth once daily.      [DISCONTINUED] mirtazapine (REMERON ORAL) Take by mouth.       No current facility-administered medications on file prior to visit.       Past Medical History:   Diagnosis Date    Hypertension     Kidney transplant recipient     Post-transplant diabetes mellitus      Past Surgical History:   Procedure Laterality Date    ANKLE FRACTURE SURGERY      AV FISTULA PLACEMENT       SECTION      KIDNEY TRANSPLANT       Family History   Problem Relation Age of Onset    Nephrotic syndrome Mother        Social History     Socioeconomic History    Marital status: Single   Tobacco Use    Smoking status: Never Smoker    Smokeless tobacco: Never Used   Substance and Sexual Activity    Alcohol use: No    Drug use: No          Review of Systems   Constitutional: Negative.    Respiratory: Negative.    Cardiovascular: Negative.    Gastrointestinal: Negative.    Musculoskeletal: Positive for joint pain.   Skin: Negative.    Endo/Heme/Allergies: Negative.    Psychiatric/Behavioral: Negative.        OBJECTIVE:     There were no vitals filed for this visit.              Physical Exam:  Physical Exam  Constitutional:       Appearance: She is obese.   HENT:      Head: Normocephalic and atraumatic.   Eyes:      Pupils: Pupils are equal, round, and reactive to light.   Cardiovascular:      Rate and Rhythm: Normal rate and  regular rhythm.      Heart sounds: Normal heart sounds.   Pulmonary:      Effort: Pulmonary effort is normal.      Breath sounds: Normal breath sounds.   Abdominal:      General: Abdomen is flat. Bowel sounds are normal.      Palpations: Abdomen is soft.          Comments: Lower midline abdominal scars noted from previous    Musculoskeletal:         General: No swelling. Normal range of motion.      Cervical back: Normal range of motion.   Skin:     General: Skin is warm and dry.   Neurological:      General: No focal deficit present.      Mental Status: She is alert and oriented to person, place, and time.      Cranial Nerves: No cranial nerve deficit.   Psychiatric:         Mood and Affect: Mood normal.         Judgment: Judgment normal.             ASSESSMENT/PLAN:   Chronic kidney disease stage 5 preparing for peritoneal dialysis  PLAN:  Discussed laparoscopic tunneled peritoneal dialysis catheter insertion with externalization.  The procedure, risk and benefits were discussed with patient.  Also discussed the possibility of her not being a good candidate due to extensive intra-abdominal adhesions from previous C-sections and diverticulitis.  Surgery scheduled for 2022

## 2022-08-11 ENCOUNTER — OUTSIDE PLACE OF SERVICE (OUTPATIENT)
Dept: ADMINISTRATIVE | Facility: OTHER | Age: 52
End: 2022-08-11
Payer: MEDICAID

## 2022-08-11 LAB
GLUCOSE SERPL-MCNC: 105 MG/DL (ref 70–105)
GLUCOSE SERPL-MCNC: 117 MG/DL (ref 70–105)

## 2022-08-11 PROCEDURE — 49324 LAP INSERT TUNNEL IP CATH: CPT | Mod: ,,, | Performed by: SURGERY

## 2022-08-11 PROCEDURE — 49324 PR LAP INSERTION TUNNELED INTRAPERITONEAL CATHETER: ICD-10-PCS | Mod: ,,, | Performed by: SURGERY

## 2023-07-06 ENCOUNTER — OFFICE VISIT (OUTPATIENT)
Dept: UROLOGY | Facility: CLINIC | Age: 53
End: 2023-07-06
Payer: MEDICAID

## 2023-07-06 VITALS
HEART RATE: 64 BPM | RESPIRATION RATE: 18 BRPM | DIASTOLIC BLOOD PRESSURE: 68 MMHG | SYSTOLIC BLOOD PRESSURE: 116 MMHG | TEMPERATURE: 99 F

## 2023-07-06 DIAGNOSIS — N18.6 ACUTE RENAL FAILURE SUPERIMPOSED ON CHRONIC KIDNEY DISEASE, ON CHRONIC DIALYSIS, UNSPECIFIED ACUTE RENAL FAILURE TYPE: Primary | ICD-10-CM

## 2023-07-06 DIAGNOSIS — Z99.2 ACUTE RENAL FAILURE SUPERIMPOSED ON CHRONIC KIDNEY DISEASE, ON CHRONIC DIALYSIS, UNSPECIFIED ACUTE RENAL FAILURE TYPE: Primary | ICD-10-CM

## 2023-07-06 DIAGNOSIS — N11.9 CHRONIC PYELONEPHRITIS: ICD-10-CM

## 2023-07-06 DIAGNOSIS — N17.9 ACUTE RENAL FAILURE SUPERIMPOSED ON CHRONIC KIDNEY DISEASE, ON CHRONIC DIALYSIS, UNSPECIFIED ACUTE RENAL FAILURE TYPE: Primary | ICD-10-CM

## 2023-07-06 PROCEDURE — 1159F MED LIST DOCD IN RCRD: CPT | Mod: CPTII,S$GLB,, | Performed by: UROLOGY

## 2023-07-06 PROCEDURE — 1160F RVW MEDS BY RX/DR IN RCRD: CPT | Mod: CPTII,S$GLB,, | Performed by: UROLOGY

## 2023-07-06 PROCEDURE — 1159F PR MEDICATION LIST DOCUMENTED IN MEDICAL RECORD: ICD-10-PCS | Mod: CPTII,S$GLB,, | Performed by: UROLOGY

## 2023-07-06 PROCEDURE — 3074F SYST BP LT 130 MM HG: CPT | Mod: CPTII,S$GLB,, | Performed by: UROLOGY

## 2023-07-06 PROCEDURE — 4010F PR ACE/ARB THEARPY RXD/TAKEN: ICD-10-PCS | Mod: CPTII,S$GLB,, | Performed by: UROLOGY

## 2023-07-06 PROCEDURE — 99205 OFFICE O/P NEW HI 60 MIN: CPT | Mod: S$GLB,,, | Performed by: UROLOGY

## 2023-07-06 PROCEDURE — 3078F PR MOST RECENT DIASTOLIC BLOOD PRESSURE < 80 MM HG: ICD-10-PCS | Mod: CPTII,S$GLB,, | Performed by: UROLOGY

## 2023-07-06 PROCEDURE — 3074F PR MOST RECENT SYSTOLIC BLOOD PRESSURE < 130 MM HG: ICD-10-PCS | Mod: CPTII,S$GLB,, | Performed by: UROLOGY

## 2023-07-06 PROCEDURE — 99205 PR OFFICE/OUTPT VISIT, NEW, LEVL V, 60-74 MIN: ICD-10-PCS | Mod: S$GLB,,, | Performed by: UROLOGY

## 2023-07-06 PROCEDURE — 4010F ACE/ARB THERAPY RXD/TAKEN: CPT | Mod: CPTII,S$GLB,, | Performed by: UROLOGY

## 2023-07-06 PROCEDURE — 3078F DIAST BP <80 MM HG: CPT | Mod: CPTII,S$GLB,, | Performed by: UROLOGY

## 2023-07-06 PROCEDURE — 1160F PR REVIEW ALL MEDS BY PRESCRIBER/CLIN PHARMACIST DOCUMENTED: ICD-10-PCS | Mod: CPTII,S$GLB,, | Performed by: UROLOGY

## 2023-07-06 RX ORDER — TRAZODONE HYDROCHLORIDE 50 MG/1
50 TABLET ORAL NIGHTLY
COMMUNITY
Start: 2023-06-03

## 2023-07-06 RX ORDER — ONDANSETRON 4 MG/1
TABLET, ORALLY DISINTEGRATING ORAL
COMMUNITY
Start: 2023-02-23

## 2023-07-06 RX ORDER — GABAPENTIN 100 MG/1
200 CAPSULE ORAL NIGHTLY
COMMUNITY
Start: 2023-06-06

## 2023-07-06 RX ORDER — LOSARTAN POTASSIUM 25 MG/1
25 TABLET ORAL NIGHTLY
COMMUNITY
Start: 2023-01-26

## 2023-07-06 RX ORDER — INSULIN GLARGINE 100 [IU]/ML
40 INJECTION, SOLUTION SUBCUTANEOUS
COMMUNITY
Start: 2023-01-14

## 2023-07-06 RX ORDER — NITROFURANTOIN MACROCRYSTALS 50 MG/1
50 CAPSULE ORAL
COMMUNITY
Start: 2023-06-23

## 2023-07-06 RX ORDER — MECLIZINE HYDROCHLORIDE 25 MG/1
25 TABLET ORAL
COMMUNITY
Start: 2023-03-03

## 2023-07-06 NOTE — PROGRESS NOTES
Subjective:       Patient ID: Bhumi Siegel is a 52 y.o. female.    Chief Complaint: possible nephrectomy      HPI:  52-year-old female on peritoneal dialysis she is also had a transplant kidney into the right pelvis by report she has pyelonephritis every other month or so requiring hospitalization she was referred for possible nephrectomy of transplant kidney    Past Medical History:   Past Medical History:   Diagnosis Date    Clotting disorder     Diverticular disease of large intestine without perforation or abscess     Hypertension     Kidney transplant recipient     Post-transplant diabetes mellitus        Past Surgical Historical:   Past Surgical History:   Procedure Laterality Date    ANKLE FRACTURE SURGERY      AV FISTULA PLACEMENT       SECTION      KIDNEY TRANSPLANT          Medications:   Medication List with Changes/Refills   Current Medications    ATORVASTATIN (LIPITOR) 20 MG TABLET        ESCITALOPRAM OXALATE (LEXAPRO) 10 MG TABLET    Take 1 tablet (10 mg total) by mouth once daily.    FUROSEMIDE (LASIX) 20 MG TABLET    Take 20 mg by mouth once daily.    GABAPENTIN (NEURONTIN) 100 MG CAPSULE    Take 200 mg by mouth every evening.    HYDROXYZINE HCL (ATARAX) 10 MG TAB    Take 10 mg by mouth every evening.    INSULIN ASPART U-100 (NOVOLOG U-100 INSULIN ASPART) 100 UNIT/ML INJECTION    Novolog U-100 Insulin aspart 100 unit/mL subcutaneous solution   Inject 8 units 3 times a day by subcutaneous route.    LANTUS SOLOSTAR U-100 INSULIN GLARGINE 100 UNITS/ML SUBQ PEN    Inject 40 Units into the skin.    LOSARTAN (COZAAR) 25 MG TABLET    Take 25 mg by mouth every evening.    MECLIZINE (ANTIVERT) 25 MG TABLET    Take 25 mg by mouth.    NITROFURANTOIN (MACRODANTIN) 50 MG CAPSULE    Take 50 mg by mouth.    OLANZAPINE (ZYPREXA) 2.5 MG TABLET    Take 2.5 mg by mouth every evening.    ONDANSETRON (ZOFRAN-ODT) 4 MG TBDL    LET ONE TABLET DISSOLVE UNDER THE TONGUE EVERY EIGHT HOURS    PREDNISONE (DELTASONE) 5  MG TABLET    Take 5 mg by mouth once daily.    TRAZODONE (DESYREL) 50 MG TABLET    Take 50 mg by mouth every evening.   Discontinued Medications    GABAPENTIN (NEURONTIN) 400 MG CAPSULE    Take 400 mg by mouth every evening.    INSULIN DETEMIR U-100 (LEVEMIR FLEXTOUCH) 100 UNIT/ML (3 ML) SUBQ INPN PEN    Inject 35 Units into the skin once daily.    INSULIN GLARGINE (LANTUS U-100 INSULIN) 100 UNIT/ML INJECTION    Lantus U-100 Insulin 100 unit/mL subcutaneous solution   Inject 25 units every day by subcutaneous route at bedtime.    LABETALOL (NORMODYNE) 200 MG TABLET    Take 200 mg by mouth 2 (two) times daily.    SODIUM BICARBONATE 650 MG TABLET    Take 2 tablets (1,300 mg total) by mouth 3 (three) times daily.    TACROLIMUS (PROGRAF) 1 MG CAP    Take 4 capsules (4 mg total) by mouth once daily.        Past Social History:   Social History     Socioeconomic History    Marital status: Single   Tobacco Use    Smoking status: Never    Smokeless tobacco: Never   Substance and Sexual Activity    Alcohol use: No    Drug use: No       Allergies:   Review of patient's allergies indicates:   Allergen Reactions    Iron analogues Anaphylaxis    Morphine Other (See Comments)     Burns all over once IVP    Pcn [penicillins] Rash        Family History:   Family History   Problem Relation Age of Onset    Nephrotic syndrome Mother         Review of Systems:  Review of Systems   Constitutional:  Negative for activity change and appetite change.   HENT:  Negative for congestion and dental problem.    Eyes:  Negative for pain and discharge.   Respiratory:  Negative for chest tightness and shortness of breath.    Cardiovascular:  Negative for chest pain.   Gastrointestinal:  Negative for abdominal distention and abdominal pain.   Endocrine: Negative for cold intolerance, heat intolerance and polyuria.   Genitourinary:  Negative for decreased urine volume, difficulty urinating, dyspareunia, dysuria, enuresis, flank pain, frequency,  genital sores, hematuria, menstrual problem, pelvic pain, urgency, vaginal bleeding, vaginal discharge and vaginal pain.   Musculoskeletal:  Negative for back pain and neck pain.   Skin:  Negative for color change and rash.   Allergic/Immunologic: Negative for immunocompromised state.   Neurological:  Negative for dizziness.   Hematological:  Negative for adenopathy.   Psychiatric/Behavioral:  Negative for agitation, behavioral problems and confusion.      Physical Exam:  Physical Exam  Constitutional:       Appearance: She is well-developed.   HENT:      Head: Normocephalic and atraumatic.      Right Ear: External ear normal.      Left Ear: External ear normal.   Eyes:      Conjunctiva/sclera: Conjunctivae normal.   Neck:      Vascular: No JVD.   Cardiovascular:      Rate and Rhythm: Normal rate and regular rhythm.   Pulmonary:      Effort: Pulmonary effort is normal. No respiratory distress.      Breath sounds: Normal breath sounds. No wheezing.   Abdominal:      General: There is no distension.      Palpations: Abdomen is soft.      Tenderness: There is no abdominal tenderness. There is no rebound.   Musculoskeletal:         General: Normal range of motion.      Cervical back: Normal range of motion.   Skin:     General: Skin is warm and dry.      Findings: No erythema or rash.   Neurological:      Mental Status: She is alert and oriented to person, place, and time.   Psychiatric:         Behavior: Behavior normal.       Assessment/Plan:       Problem List Items Addressed This Visit    None  Visit Diagnoses       Acute renal failure superimposed on chronic kidney disease, on chronic dialysis, unspecified acute renal failure type    -  Primary    Chronic pyelonephritis                   Chronic pyelonephritis of transplant kidney:   I personally reviewed the patient's CT imaging.  There was mention of possible pyelonephritis of the transplanted kidney on CT imaging with nonspecific changes of the perinephric fat  in the pelvis.  Had a terri discussion with the patient regarding the fact he is had multiple abdominal operations, this transplant kidney will be difficult to dissect out, and peritoneal dialysis will further make the dissection and safe removal of her pelvic kidney more difficult.  I feel that this is a case for a larger University setting with multiple urologists and residents to assist.  Patient agrees and would like to be referred to [facility] for nephrectomy of transplant kidney

## 2023-07-14 ENCOUNTER — OUTSIDE PLACE OF SERVICE (OUTPATIENT)
Dept: INTERVENTIONAL RADIOLOGY/VASCULAR | Facility: CLINIC | Age: 53
End: 2023-07-14
Payer: MEDICARE

## 2023-07-14 PROCEDURE — 36556 PR INSERT NON-TUNNEL CV CATH 5+ YRS OLD: ICD-10-PCS | Mod: RT,,, | Performed by: RADIOLOGY

## 2023-07-14 PROCEDURE — 77001 FLUOROGUIDE FOR VEIN DEVICE: CPT | Mod: 26,,, | Performed by: RADIOLOGY

## 2023-07-14 PROCEDURE — 36556 INSERT NON-TUNNEL CV CATH: CPT | Mod: RT,,, | Performed by: RADIOLOGY

## 2023-07-14 PROCEDURE — 77001 CHG FLUOROGUIDE CNTRL VEN ACCESS,PLACE,REPLACE,REMOVE: ICD-10-PCS | Mod: 26,,, | Performed by: RADIOLOGY

## 2023-07-18 ENCOUNTER — OUTSIDE PLACE OF SERVICE (OUTPATIENT)
Dept: INTERVENTIONAL RADIOLOGY/VASCULAR | Facility: CLINIC | Age: 53
End: 2023-07-18
Payer: MEDICARE

## 2023-07-18 PROCEDURE — 99152 MOD SED SAME PHYS/QHP 5/>YRS: CPT | Mod: ,,, | Performed by: RADIOLOGY

## 2023-07-18 PROCEDURE — 77001 CHG FLUOROGUIDE CNTRL VEN ACCESS,PLACE,REPLACE,REMOVE: ICD-10-PCS | Mod: 26,,, | Performed by: RADIOLOGY

## 2023-07-18 PROCEDURE — 36558 PR INSERT TUNNELED CV CATH W/O PORT OR PUMP: ICD-10-PCS | Mod: RT,,, | Performed by: RADIOLOGY

## 2023-07-18 PROCEDURE — 36558 INSERT TUNNELED CV CATH: CPT | Mod: RT,,, | Performed by: RADIOLOGY

## 2023-07-18 PROCEDURE — 99152 PR MOD CONSCIOUS SEDATION, SAME PHYS, 5+ YRS, FIRST 15 MIN: ICD-10-PCS | Mod: ,,, | Performed by: RADIOLOGY

## 2023-07-18 PROCEDURE — 77001 FLUOROGUIDE FOR VEIN DEVICE: CPT | Mod: 26,,, | Performed by: RADIOLOGY

## 2025-02-26 ENCOUNTER — OUTSIDE PLACE OF SERVICE (OUTPATIENT)
Dept: INTERVENTIONAL RADIOLOGY/VASCULAR | Facility: CLINIC | Age: 55
End: 2025-02-26
Payer: MEDICARE